# Patient Record
Sex: MALE | Race: WHITE | NOT HISPANIC OR LATINO | URBAN - METROPOLITAN AREA
[De-identification: names, ages, dates, MRNs, and addresses within clinical notes are randomized per-mention and may not be internally consistent; named-entity substitution may affect disease eponyms.]

---

## 2017-01-01 ENCOUNTER — EMERGENCY (EMERGENCY)
Facility: HOSPITAL | Age: 82
LOS: 0 days | Discharge: HOME | End: 2017-04-08
Attending: EMERGENCY MEDICINE
Payer: MEDICARE

## 2017-01-01 ENCOUNTER — INPATIENT (INPATIENT)
Facility: HOSPITAL | Age: 82
LOS: 14 days | Discharge: HOSPICE | End: 2017-06-24
Attending: INTERNAL MEDICINE | Admitting: INTERNAL MEDICINE
Payer: MEDICARE

## 2017-01-01 ENCOUNTER — INPATIENT (INPATIENT)
Facility: HOSPITAL | Age: 82
LOS: 3 days | End: 2017-06-28
Attending: INTERNAL MEDICINE | Admitting: INTERNAL MEDICINE

## 2017-01-01 VITALS
HEART RATE: 135 BPM | TEMPERATURE: 98 F | OXYGEN SATURATION: 96 % | WEIGHT: 125 LBS | SYSTOLIC BLOOD PRESSURE: 118 MMHG | DIASTOLIC BLOOD PRESSURE: 70 MMHG | HEIGHT: 67 IN | RESPIRATION RATE: 16 BRPM

## 2017-01-01 VITALS
HEART RATE: 101 BPM | DIASTOLIC BLOOD PRESSURE: 56 MMHG | WEIGHT: 124.34 LBS | OXYGEN SATURATION: 93 % | SYSTOLIC BLOOD PRESSURE: 119 MMHG | HEIGHT: 68 IN | TEMPERATURE: 99 F | RESPIRATION RATE: 18 BRPM

## 2017-01-01 VITALS
DIASTOLIC BLOOD PRESSURE: 95 MMHG | WEIGHT: 149.91 LBS | OXYGEN SATURATION: 96 % | SYSTOLIC BLOOD PRESSURE: 154 MMHG | HEART RATE: 118 BPM | RESPIRATION RATE: 22 BRPM | HEIGHT: 68 IN

## 2017-01-01 VITALS
TEMPERATURE: 100 F | SYSTOLIC BLOOD PRESSURE: 102 MMHG | RESPIRATION RATE: 20 BRPM | OXYGEN SATURATION: 93 % | HEART RATE: 116 BPM | DIASTOLIC BLOOD PRESSURE: 53 MMHG

## 2017-01-01 VITALS — TEMPERATURE: 101 F

## 2017-01-01 DIAGNOSIS — T83.9XXA UNSPECIFIED COMPLICATION OF GENITOURINARY PROSTHETIC DEVICE, IMPLANT AND GRAFT, INITIAL ENCOUNTER: ICD-10-CM

## 2017-01-01 DIAGNOSIS — E78.5 HYPERLIPIDEMIA, UNSPECIFIED: ICD-10-CM
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
## 2017-01-01 DIAGNOSIS — Z51.5 ENCOUNTER FOR PALLIATIVE CARE: ICD-10-CM

## 2017-01-01 DIAGNOSIS — R09.89 OTHER SPECIFIED SYMPTOMS AND SIGNS INVOLVING THE CIRCULATORY AND RESPIRATORY SYSTEMS: ICD-10-CM

## 2017-01-01 DIAGNOSIS — I50.21 ACUTE SYSTOLIC (CONGESTIVE) HEART FAILURE: ICD-10-CM

## 2017-01-01 DIAGNOSIS — E78.00 PURE HYPERCHOLESTEROLEMIA, UNSPECIFIED: ICD-10-CM
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
## 2017-01-01 DIAGNOSIS — Z79.82 LONG TERM (CURRENT) USE OF ASPIRIN: ICD-10-CM

## 2017-01-01 DIAGNOSIS — N39.0 URINARY TRACT INFECTION, SITE NOT SPECIFIED: ICD-10-CM

## 2017-01-01 DIAGNOSIS — B96.20 UNSPECIFIED ESCHERICHIA COLI [E. COLI] AS THE CAUSE OF DISEASES CLASSIFIED ELSEWHERE: ICD-10-CM

## 2017-01-01 DIAGNOSIS — I10 ESSENTIAL (PRIMARY) HYPERTENSION: ICD-10-CM

## 2017-01-01 DIAGNOSIS — Z66 DO NOT RESUSCITATE: ICD-10-CM

## 2017-01-01 DIAGNOSIS — I27.2 OTHER SECONDARY PULMONARY HYPERTENSION: ICD-10-CM

## 2017-01-01 DIAGNOSIS — I11.0 HYPERTENSIVE HEART DISEASE WITH HEART FAILURE: ICD-10-CM

## 2017-01-01 DIAGNOSIS — A41.51 SEPSIS DUE TO ESCHERICHIA COLI [E. COLI]: ICD-10-CM

## 2017-01-01 DIAGNOSIS — Z88.8 ALLERGY STATUS TO OTHER DRUGS, MEDICAMENTS AND BIOLOGICAL SUBSTANCES STATUS: ICD-10-CM

## 2017-01-01 DIAGNOSIS — N40.0 BENIGN PROSTATIC HYPERPLASIA WITHOUT LOWER URINARY TRACT SYMPTOMS: ICD-10-CM

## 2017-01-01 DIAGNOSIS — J18.9 PNEUMONIA, UNSPECIFIED ORGANISM: ICD-10-CM

## 2017-01-01 DIAGNOSIS — E43 UNSPECIFIED SEVERE PROTEIN-CALORIE MALNUTRITION: ICD-10-CM

## 2017-01-01 DIAGNOSIS — R10.30 LOWER ABDOMINAL PAIN, UNSPECIFIED: ICD-10-CM

## 2017-01-01 DIAGNOSIS — I21.4 NON-ST ELEVATION (NSTEMI) MYOCARDIAL INFARCTION: ICD-10-CM

## 2017-01-01 DIAGNOSIS — A41.9 SEPSIS, UNSPECIFIED ORGANISM: ICD-10-CM

## 2017-01-01 DIAGNOSIS — R13.10 DYSPHAGIA, UNSPECIFIED: ICD-10-CM

## 2017-01-01 DIAGNOSIS — Z86.73 PERSONAL HISTORY OF TRANSIENT ISCHEMIC ATTACK (TIA), AND CEREBRAL INFARCTION WITHOUT RESIDUAL DEFICITS: ICD-10-CM

## 2017-01-01 DIAGNOSIS — F03.90 UNSPECIFIED DEMENTIA, UNSPECIFIED SEVERITY, WITHOUT BEHAVIORAL DISTURBANCE, PSYCHOTIC DISTURBANCE, MOOD DISTURBANCE, AND ANXIETY: ICD-10-CM

## 2017-01-01 DIAGNOSIS — N40.1 BENIGN PROSTATIC HYPERPLASIA WITH LOWER URINARY TRACT SYMPTOMS: ICD-10-CM

## 2017-01-01 DIAGNOSIS — J69.0 PNEUMONITIS DUE TO INHALATION OF FOOD AND VOMIT: ICD-10-CM

## 2017-01-01 LAB
-  AMIKACIN: SIGNIFICANT CHANGE UP
-  AMPICILLIN/SULBACTAM: SIGNIFICANT CHANGE UP
-  AMPICILLIN: SIGNIFICANT CHANGE UP
-  AZTREONAM: SIGNIFICANT CHANGE UP
-  CEFAZOLIN: SIGNIFICANT CHANGE UP
-  CEFEPIME: SIGNIFICANT CHANGE UP
-  CEFOXITIN: SIGNIFICANT CHANGE UP
-  CEFTAZIDIME: SIGNIFICANT CHANGE UP
-  CEFTRIAXONE: SIGNIFICANT CHANGE UP
-  CIPROFLOXACIN: SIGNIFICANT CHANGE UP
-  ERTAPENEM: SIGNIFICANT CHANGE UP
-  GENTAMICIN: SIGNIFICANT CHANGE UP
-  IMIPENEM: SIGNIFICANT CHANGE UP
-  LEVOFLOXACIN: SIGNIFICANT CHANGE UP
-  MEROPENEM: SIGNIFICANT CHANGE UP
-  NITROFURANTOIN: SIGNIFICANT CHANGE UP
-  PIPERACILLIN/TAZOBACTAM: SIGNIFICANT CHANGE UP
-  TOBRAMYCIN: SIGNIFICANT CHANGE UP
-  TRIMETHOPRIM/SULFAMETHOXAZOLE: SIGNIFICANT CHANGE UP
ALBUMIN SERPL ELPH-MCNC: 2.4 G/DL — LOW (ref 3.3–5)
ALBUMIN SERPL ELPH-MCNC: 3.5 G/DL — SIGNIFICANT CHANGE UP (ref 3.3–5)
ALP SERPL-CCNC: 102 U/L — SIGNIFICANT CHANGE UP (ref 40–120)
ALP SERPL-CCNC: 63 U/L — SIGNIFICANT CHANGE UP (ref 40–120)
ALT FLD-CCNC: 136 U/L — HIGH (ref 12–78)
ALT FLD-CCNC: 23 U/L — SIGNIFICANT CHANGE UP (ref 12–78)
ANION GAP SERPL CALC-SCNC: 10 MMOL/L — SIGNIFICANT CHANGE UP (ref 5–17)
ANION GAP SERPL CALC-SCNC: 11 MMOL/L — SIGNIFICANT CHANGE UP (ref 5–17)
ANION GAP SERPL CALC-SCNC: 12 MMOL/L — SIGNIFICANT CHANGE UP (ref 5–17)
ANION GAP SERPL CALC-SCNC: 9 MMOL/L — SIGNIFICANT CHANGE UP (ref 5–17)
ANION GAP SERPL CALC-SCNC: 9 MMOL/L — SIGNIFICANT CHANGE UP (ref 5–17)
APPEARANCE UR: ABNORMAL
APTT BLD: 39 SEC — HIGH (ref 27.5–37.4)
AST SERPL-CCNC: 124 U/L — HIGH (ref 15–37)
AST SERPL-CCNC: 35 U/L — SIGNIFICANT CHANGE UP (ref 15–37)
BACTERIA # UR AUTO: ABNORMAL
BASOPHILS # BLD AUTO: 0 K/UL — SIGNIFICANT CHANGE UP (ref 0–0.2)
BASOPHILS # BLD AUTO: 0.1 K/UL — SIGNIFICANT CHANGE UP (ref 0–0.2)
BASOPHILS NFR BLD AUTO: 0.4 % — SIGNIFICANT CHANGE UP (ref 0–2)
BASOPHILS NFR BLD AUTO: 0.8 % — SIGNIFICANT CHANGE UP (ref 0–2)
BILIRUB SERPL-MCNC: 0.7 MG/DL — SIGNIFICANT CHANGE UP (ref 0.2–1.2)
BILIRUB SERPL-MCNC: 1.6 MG/DL — HIGH (ref 0.2–1.2)
BILIRUB UR-MCNC: NEGATIVE — SIGNIFICANT CHANGE UP
BUN SERPL-MCNC: 22 MG/DL — SIGNIFICANT CHANGE UP (ref 7–23)
BUN SERPL-MCNC: 23 MG/DL — SIGNIFICANT CHANGE UP (ref 7–23)
BUN SERPL-MCNC: 24 MG/DL — HIGH (ref 7–23)
BUN SERPL-MCNC: 26 MG/DL — HIGH (ref 7–23)
BUN SERPL-MCNC: 31 MG/DL — HIGH (ref 7–23)
BUN SERPL-MCNC: 37 MG/DL — HIGH (ref 7–23)
BUN SERPL-MCNC: 61 MG/DL — HIGH (ref 7–23)
BUN SERPL-MCNC: 68 MG/DL — HIGH (ref 7–23)
CALCIUM SERPL-MCNC: 8.2 MG/DL — LOW (ref 8.5–10.1)
CALCIUM SERPL-MCNC: 8.3 MG/DL — LOW (ref 8.5–10.1)
CALCIUM SERPL-MCNC: 8.5 MG/DL — SIGNIFICANT CHANGE UP (ref 8.5–10.1)
CALCIUM SERPL-MCNC: 8.8 MG/DL — SIGNIFICANT CHANGE UP (ref 8.5–10.1)
CALCIUM SERPL-MCNC: 9.1 MG/DL — SIGNIFICANT CHANGE UP (ref 8.5–10.1)
CALCIUM SERPL-MCNC: 9.2 MG/DL — SIGNIFICANT CHANGE UP (ref 8.5–10.1)
CHLORIDE SERPL-SCNC: 105 MMOL/L — SIGNIFICANT CHANGE UP (ref 96–108)
CHLORIDE SERPL-SCNC: 109 MMOL/L — HIGH (ref 96–108)
CHLORIDE SERPL-SCNC: 109 MMOL/L — HIGH (ref 96–108)
CHLORIDE SERPL-SCNC: 110 MMOL/L — HIGH (ref 96–108)
CHLORIDE SERPL-SCNC: 110 MMOL/L — HIGH (ref 96–108)
CHLORIDE SERPL-SCNC: 117 MMOL/L — HIGH (ref 96–108)
CK MB BLD-MCNC: 4.7 % — HIGH (ref 0–3.5)
CK MB CFR SERPL CALC: 3.5 NG/ML — SIGNIFICANT CHANGE UP (ref 0.5–3.6)
CK SERPL-CCNC: 75 U/L — SIGNIFICANT CHANGE UP (ref 26–308)
CO2 SERPL-SCNC: 23 MMOL/L — SIGNIFICANT CHANGE UP (ref 22–31)
CO2 SERPL-SCNC: 23 MMOL/L — SIGNIFICANT CHANGE UP (ref 22–31)
CO2 SERPL-SCNC: 24 MMOL/L — SIGNIFICANT CHANGE UP (ref 22–31)
CO2 SERPL-SCNC: 26 MMOL/L — SIGNIFICANT CHANGE UP (ref 22–31)
CO2 SERPL-SCNC: 26 MMOL/L — SIGNIFICANT CHANGE UP (ref 22–31)
COLOR SPEC: YELLOW — SIGNIFICANT CHANGE UP
COMMENT - URINE: SIGNIFICANT CHANGE UP
CREAT SERPL-MCNC: 0.88 MG/DL — SIGNIFICANT CHANGE UP (ref 0.5–1.3)
CREAT SERPL-MCNC: 0.99 MG/DL — SIGNIFICANT CHANGE UP (ref 0.5–1.3)
CREAT SERPL-MCNC: 1 MG/DL — SIGNIFICANT CHANGE UP (ref 0.5–1.3)
CREAT SERPL-MCNC: 1.03 MG/DL — SIGNIFICANT CHANGE UP (ref 0.5–1.3)
CREAT SERPL-MCNC: 1.2 MG/DL — SIGNIFICANT CHANGE UP (ref 0.5–1.3)
CREAT SERPL-MCNC: 1.28 MG/DL — SIGNIFICANT CHANGE UP (ref 0.5–1.3)
CREAT SERPL-MCNC: 1.35 MG/DL — HIGH (ref 0.5–1.3)
CREAT SERPL-MCNC: 1.44 MG/DL — HIGH (ref 0.5–1.3)
CULTURE RESULTS: SIGNIFICANT CHANGE UP
DIFF PNL FLD: ABNORMAL
EOSINOPHIL # BLD AUTO: 0 K/UL — SIGNIFICANT CHANGE UP (ref 0–0.5)
EOSINOPHIL # BLD AUTO: 0.1 K/UL — SIGNIFICANT CHANGE UP (ref 0–0.5)
EOSINOPHIL NFR BLD AUTO: 0 % — SIGNIFICANT CHANGE UP (ref 0–6)
EOSINOPHIL NFR BLD AUTO: 0.7 % — SIGNIFICANT CHANGE UP (ref 0–6)
EPI CELLS # UR: SIGNIFICANT CHANGE UP
GLUCOSE SERPL-MCNC: 106 MG/DL — HIGH (ref 70–99)
GLUCOSE SERPL-MCNC: 124 MG/DL — HIGH (ref 70–99)
GLUCOSE SERPL-MCNC: 131 MG/DL — HIGH (ref 70–99)
GLUCOSE SERPL-MCNC: 151 MG/DL — HIGH (ref 70–99)
GLUCOSE SERPL-MCNC: 154 MG/DL — HIGH (ref 70–99)
GLUCOSE SERPL-MCNC: 164 MG/DL — HIGH (ref 70–99)
GLUCOSE SERPL-MCNC: 174 MG/DL — HIGH (ref 70–99)
GLUCOSE SERPL-MCNC: 83 MG/DL — SIGNIFICANT CHANGE UP (ref 70–99)
GLUCOSE UR QL: NEGATIVE MG/DL — SIGNIFICANT CHANGE UP
GRAN CASTS # UR COMP ASSIST: ABNORMAL /LPF
HCT VFR BLD CALC: 31.6 % — LOW (ref 39–50)
HCT VFR BLD CALC: 33.2 % — LOW (ref 39–50)
HCT VFR BLD CALC: 33.3 % — LOW (ref 39–50)
HCT VFR BLD CALC: 33.6 % — LOW (ref 39–50)
HCT VFR BLD CALC: 33.8 % — LOW (ref 39–50)
HCT VFR BLD CALC: 34.8 % — LOW (ref 39–50)
HCT VFR BLD CALC: 35.7 % — LOW (ref 39–50)
HCT VFR BLD CALC: 37.5 % — LOW (ref 39–50)
HGB BLD-MCNC: 10.9 G/DL — LOW (ref 13–17)
HGB BLD-MCNC: 11.2 G/DL — LOW (ref 13–17)
HGB BLD-MCNC: 11.3 G/DL — LOW (ref 13–17)
HGB BLD-MCNC: 11.4 G/DL — LOW (ref 13–17)
HGB BLD-MCNC: 11.6 G/DL — LOW (ref 13–17)
HGB BLD-MCNC: 11.7 G/DL — LOW (ref 13–17)
HGB BLD-MCNC: 11.7 G/DL — LOW (ref 13–17)
HGB BLD-MCNC: 12.6 G/DL — LOW (ref 13–17)
INR BLD: 1.39 RATIO — HIGH (ref 0.88–1.16)
KETONES UR-MCNC: ABNORMAL
LACTATE SERPL-SCNC: 2.6 MMOL/L — HIGH (ref 0.7–2)
LEGIONELLA AG UR QL: NEGATIVE — SIGNIFICANT CHANGE UP
LEUKOCYTE ESTERASE UR-ACNC: ABNORMAL
LIDOCAIN IGE QN: 52 U/L — LOW (ref 73–393)
LYMPHOCYTES # BLD AUTO: 0.7 K/UL — LOW (ref 1–3.3)
LYMPHOCYTES # BLD AUTO: 1.1 K/UL — SIGNIFICANT CHANGE UP (ref 1–3.3)
LYMPHOCYTES # BLD AUTO: 12.8 % — LOW (ref 13–44)
LYMPHOCYTES # BLD AUTO: 5.7 % — LOW (ref 13–44)
MAGNESIUM SERPL-MCNC: 2.1 MG/DL — SIGNIFICANT CHANGE UP (ref 1.6–2.6)
MAGNESIUM SERPL-MCNC: 2.2 MG/DL — SIGNIFICANT CHANGE UP (ref 1.6–2.6)
MAGNESIUM SERPL-MCNC: 2.3 MG/DL — SIGNIFICANT CHANGE UP (ref 1.6–2.6)
MCHC RBC-ENTMCNC: 25.5 PG — LOW (ref 27–34)
MCHC RBC-ENTMCNC: 26.1 PG — LOW (ref 27–34)
MCHC RBC-ENTMCNC: 26.3 PG — LOW (ref 27–34)
MCHC RBC-ENTMCNC: 26.4 PG — LOW (ref 27–34)
MCHC RBC-ENTMCNC: 26.6 PG — LOW (ref 27–34)
MCHC RBC-ENTMCNC: 26.7 PG — LOW (ref 27–34)
MCHC RBC-ENTMCNC: 26.8 PG — LOW (ref 27–34)
MCHC RBC-ENTMCNC: 27.1 PG — SIGNIFICANT CHANGE UP (ref 27–34)
MCHC RBC-ENTMCNC: 32.8 GM/DL — SIGNIFICANT CHANGE UP (ref 32–36)
MCHC RBC-ENTMCNC: 33.5 GM/DL — SIGNIFICANT CHANGE UP (ref 32–36)
MCHC RBC-ENTMCNC: 33.7 GM/DL — SIGNIFICANT CHANGE UP (ref 32–36)
MCHC RBC-ENTMCNC: 33.7 GM/DL — SIGNIFICANT CHANGE UP (ref 32–36)
MCHC RBC-ENTMCNC: 33.9 GM/DL — SIGNIFICANT CHANGE UP (ref 32–36)
MCHC RBC-ENTMCNC: 34 GM/DL — SIGNIFICANT CHANGE UP (ref 32–36)
MCHC RBC-ENTMCNC: 34.3 GM/DL — SIGNIFICANT CHANGE UP (ref 32–36)
MCHC RBC-ENTMCNC: 34.4 GM/DL — SIGNIFICANT CHANGE UP (ref 32–36)
MCV RBC AUTO: 77.3 FL — LOW (ref 80–100)
MCV RBC AUTO: 77.7 FL — LOW (ref 80–100)
MCV RBC AUTO: 77.8 FL — LOW (ref 80–100)
MCV RBC AUTO: 77.9 FL — LOW (ref 80–100)
MCV RBC AUTO: 78.5 FL — LOW (ref 80–100)
MCV RBC AUTO: 78.9 FL — LOW (ref 80–100)
METHOD TYPE: SIGNIFICANT CHANGE UP
MONOCYTES # BLD AUTO: 0.7 K/UL — SIGNIFICANT CHANGE UP (ref 0–0.9)
MONOCYTES # BLD AUTO: 0.7 K/UL — SIGNIFICANT CHANGE UP (ref 0–0.9)
MONOCYTES NFR BLD AUTO: 5.5 % — SIGNIFICANT CHANGE UP (ref 2–14)
MONOCYTES NFR BLD AUTO: 8.4 % — SIGNIFICANT CHANGE UP (ref 2–14)
NEUTROPHILS # BLD AUTO: 11.4 K/UL — HIGH (ref 1.8–7.4)
NEUTROPHILS # BLD AUTO: 6.8 K/UL — SIGNIFICANT CHANGE UP (ref 1.8–7.4)
NEUTROPHILS NFR BLD AUTO: 77.7 % — HIGH (ref 43–77)
NEUTROPHILS NFR BLD AUTO: 88 % — HIGH (ref 43–77)
NITRITE UR-MCNC: POSITIVE
NT-PROBNP SERPL-SCNC: HIGH PG/ML (ref 0–450)
NT-PROBNP SERPL-SCNC: HIGH PG/ML (ref 0–450)
ORGANISM # SPEC MICROSCOPIC CNT: SIGNIFICANT CHANGE UP
ORGANISM # SPEC MICROSCOPIC CNT: SIGNIFICANT CHANGE UP
PH UR: 6 — SIGNIFICANT CHANGE UP (ref 5–8)
PHOSPHATE SERPL-MCNC: 3 MG/DL — SIGNIFICANT CHANGE UP (ref 2.5–4.5)
PHOSPHATE SERPL-MCNC: 3 MG/DL — SIGNIFICANT CHANGE UP (ref 2.5–4.5)
PHOSPHATE SERPL-MCNC: 3.5 MG/DL — SIGNIFICANT CHANGE UP (ref 2.5–4.5)
PLATELET # BLD AUTO: 180 K/UL — SIGNIFICANT CHANGE UP (ref 150–400)
PLATELET # BLD AUTO: 213 K/UL — SIGNIFICANT CHANGE UP (ref 150–400)
PLATELET # BLD AUTO: 214 K/UL — SIGNIFICANT CHANGE UP (ref 150–400)
PLATELET # BLD AUTO: 219 K/UL — SIGNIFICANT CHANGE UP (ref 150–400)
PLATELET # BLD AUTO: 236 K/UL — SIGNIFICANT CHANGE UP (ref 150–400)
PLATELET # BLD AUTO: 253 K/UL — SIGNIFICANT CHANGE UP (ref 150–400)
PLATELET # BLD AUTO: 258 K/UL — SIGNIFICANT CHANGE UP (ref 150–400)
PLATELET # BLD AUTO: 283 K/UL — SIGNIFICANT CHANGE UP (ref 150–400)
POTASSIUM SERPL-MCNC: 3.5 MMOL/L — SIGNIFICANT CHANGE UP (ref 3.5–5.3)
POTASSIUM SERPL-MCNC: 3.5 MMOL/L — SIGNIFICANT CHANGE UP (ref 3.5–5.3)
POTASSIUM SERPL-MCNC: 3.7 MMOL/L — SIGNIFICANT CHANGE UP (ref 3.5–5.3)
POTASSIUM SERPL-MCNC: 3.8 MMOL/L — SIGNIFICANT CHANGE UP (ref 3.5–5.3)
POTASSIUM SERPL-MCNC: 4.3 MMOL/L — SIGNIFICANT CHANGE UP (ref 3.5–5.3)
POTASSIUM SERPL-MCNC: 4.4 MMOL/L — SIGNIFICANT CHANGE UP (ref 3.5–5.3)
POTASSIUM SERPL-SCNC: 3.5 MMOL/L — SIGNIFICANT CHANGE UP (ref 3.5–5.3)
POTASSIUM SERPL-SCNC: 3.5 MMOL/L — SIGNIFICANT CHANGE UP (ref 3.5–5.3)
POTASSIUM SERPL-SCNC: 3.7 MMOL/L — SIGNIFICANT CHANGE UP (ref 3.5–5.3)
POTASSIUM SERPL-SCNC: 3.8 MMOL/L — SIGNIFICANT CHANGE UP (ref 3.5–5.3)
POTASSIUM SERPL-SCNC: 4.3 MMOL/L — SIGNIFICANT CHANGE UP (ref 3.5–5.3)
POTASSIUM SERPL-SCNC: 4.4 MMOL/L — SIGNIFICANT CHANGE UP (ref 3.5–5.3)
PROCALCITONIN SERPL-MCNC: 0.18 NG/ML — HIGH (ref 0–0.04)
PROT SERPL-MCNC: 6.4 GM/DL — SIGNIFICANT CHANGE UP (ref 6–8.3)
PROT SERPL-MCNC: 7.2 GM/DL — SIGNIFICANT CHANGE UP (ref 6–8.3)
PROT UR-MCNC: 500 MG/DL
PROTHROM AB SERPL-ACNC: 15.3 SEC — HIGH (ref 9.8–12.7)
RBC # BLD: 4.01 M/UL — LOW (ref 4.2–5.8)
RBC # BLD: 4.21 M/UL — SIGNIFICANT CHANGE UP (ref 4.2–5.8)
RBC # BLD: 4.24 M/UL — SIGNIFICANT CHANGE UP (ref 4.2–5.8)
RBC # BLD: 4.32 M/UL — SIGNIFICANT CHANGE UP (ref 4.2–5.8)
RBC # BLD: 4.35 M/UL — SIGNIFICANT CHANGE UP (ref 4.2–5.8)
RBC # BLD: 4.5 M/UL — SIGNIFICANT CHANGE UP (ref 4.2–5.8)
RBC # BLD: 4.59 M/UL — SIGNIFICANT CHANGE UP (ref 4.2–5.8)
RBC # BLD: 4.76 M/UL — SIGNIFICANT CHANGE UP (ref 4.2–5.8)
RBC # FLD: 13.3 % — SIGNIFICANT CHANGE UP (ref 11–15)
RBC # FLD: 13.4 % — SIGNIFICANT CHANGE UP (ref 11–15)
RBC # FLD: 13.4 % — SIGNIFICANT CHANGE UP (ref 11–15)
RBC # FLD: 13.5 % — SIGNIFICANT CHANGE UP (ref 11–15)
RBC # FLD: 13.6 % — SIGNIFICANT CHANGE UP (ref 11–15)
RBC # FLD: 13.7 % — SIGNIFICANT CHANGE UP (ref 11–15)
RBC # FLD: 14.3 % — SIGNIFICANT CHANGE UP (ref 11–15)
RBC # FLD: 14.7 % — SIGNIFICANT CHANGE UP (ref 11–15)
RBC CASTS # UR COMP ASSIST: ABNORMAL /HPF (ref 0–4)
SODIUM SERPL-SCNC: 138 MMOL/L — SIGNIFICANT CHANGE UP (ref 135–145)
SODIUM SERPL-SCNC: 139 MMOL/L — SIGNIFICANT CHANGE UP (ref 135–145)
SODIUM SERPL-SCNC: 140 MMOL/L — SIGNIFICANT CHANGE UP (ref 135–145)
SODIUM SERPL-SCNC: 143 MMOL/L — SIGNIFICANT CHANGE UP (ref 135–145)
SODIUM SERPL-SCNC: 146 MMOL/L — HIGH (ref 135–145)
SODIUM SERPL-SCNC: 153 MMOL/L — HIGH (ref 135–145)
SP GR SPEC: 1.02 — SIGNIFICANT CHANGE UP (ref 1.01–1.02)
SPECIMEN SOURCE: SIGNIFICANT CHANGE UP
TROPONIN I SERPL-MCNC: 0.35 NG/ML — HIGH (ref 0.01–0.04)
TROPONIN I SERPL-MCNC: 0.4 NG/ML — HIGH (ref 0.01–0.04)
TROPONIN I SERPL-MCNC: 0.5 NG/ML — HIGH (ref 0.01–0.04)
TROPONIN I SERPL-MCNC: 0.53 NG/ML — HIGH (ref 0.01–0.04)
TROPONIN I SERPL-MCNC: 0.57 NG/ML — HIGH (ref 0.01–0.04)
UROBILINOGEN FLD QL: NEGATIVE MG/DL — SIGNIFICANT CHANGE UP
VANCOMYCIN TROUGH SERPL-MCNC: 14.9 UG/ML — SIGNIFICANT CHANGE UP (ref 10–20)
WBC # BLD: 10.8 K/UL — HIGH (ref 3.8–10.5)
WBC # BLD: 12.9 K/UL — HIGH (ref 3.8–10.5)
WBC # BLD: 14.7 K/UL — HIGH (ref 3.8–10.5)
WBC # BLD: 16 K/UL — HIGH (ref 3.8–10.5)
WBC # BLD: 17.1 K/UL — HIGH (ref 3.8–10.5)
WBC # BLD: 8.7 K/UL — SIGNIFICANT CHANGE UP (ref 3.8–10.5)
WBC # BLD: 9.1 K/UL — SIGNIFICANT CHANGE UP (ref 3.8–10.5)
WBC # BLD: 9.4 K/UL — SIGNIFICANT CHANGE UP (ref 3.8–10.5)
WBC # FLD AUTO: 10.8 K/UL — HIGH (ref 3.8–10.5)
WBC # FLD AUTO: 12.9 K/UL — HIGH (ref 3.8–10.5)
WBC # FLD AUTO: 14.7 K/UL — HIGH (ref 3.8–10.5)
WBC # FLD AUTO: 16 K/UL — HIGH (ref 3.8–10.5)
WBC # FLD AUTO: 17.1 K/UL — HIGH (ref 3.8–10.5)
WBC # FLD AUTO: 8.7 K/UL — SIGNIFICANT CHANGE UP (ref 3.8–10.5)
WBC # FLD AUTO: 9.1 K/UL — SIGNIFICANT CHANGE UP (ref 3.8–10.5)
WBC # FLD AUTO: 9.4 K/UL — SIGNIFICANT CHANGE UP (ref 3.8–10.5)
WBC UR QL: SIGNIFICANT CHANGE UP /HPF (ref 0–5)

## 2017-01-01 PROCEDURE — 71010: CPT | Mod: 26

## 2017-01-01 PROCEDURE — 99291 CRITICAL CARE FIRST HOUR: CPT

## 2017-01-01 PROCEDURE — 99283 EMERGENCY DEPT VISIT LOW MDM: CPT | Mod: 25

## 2017-01-01 PROCEDURE — 78452 HT MUSCLE IMAGE SPECT MULT: CPT | Mod: 26

## 2017-01-01 PROCEDURE — 93306 TTE W/DOPPLER COMPLETE: CPT | Mod: 26

## 2017-01-01 PROCEDURE — 74176 CT ABD & PELVIS W/O CONTRAST: CPT | Mod: 26

## 2017-01-01 RX ORDER — ROBINUL 0.2 MG/ML
0.2 INJECTION INTRAMUSCULAR; INTRAVENOUS EVERY 8 HOURS
Qty: 0 | Refills: 0 | Status: DISCONTINUED | OUTPATIENT
Start: 2017-01-01 | End: 2017-01-01

## 2017-01-01 RX ORDER — PIPERACILLIN AND TAZOBACTAM 4; .5 G/20ML; G/20ML
3.38 INJECTION, POWDER, LYOPHILIZED, FOR SOLUTION INTRAVENOUS EVERY 8 HOURS
Qty: 0 | Refills: 0 | Status: DISCONTINUED | OUTPATIENT
Start: 2017-01-01 | End: 2017-01-01

## 2017-01-01 RX ORDER — VANCOMYCIN HCL 1 G
1000 VIAL (EA) INTRAVENOUS EVERY 12 HOURS
Qty: 0 | Refills: 0 | Status: DISCONTINUED | OUTPATIENT
Start: 2017-01-01 | End: 2017-01-01

## 2017-01-01 RX ORDER — HEPARIN SODIUM 5000 [USP'U]/ML
5000 INJECTION INTRAVENOUS; SUBCUTANEOUS EVERY 12 HOURS
Qty: 0 | Refills: 0 | Status: DISCONTINUED | OUTPATIENT
Start: 2017-01-01 | End: 2017-01-01

## 2017-01-01 RX ORDER — ALPRAZOLAM 0.25 MG
0.25 TABLET ORAL ONCE
Qty: 0 | Refills: 0 | Status: DISCONTINUED | OUTPATIENT
Start: 2017-01-01 | End: 2017-01-01

## 2017-01-01 RX ORDER — ALPRAZOLAM 0.25 MG
0.25 TABLET ORAL
Qty: 0 | Refills: 0 | COMMUNITY

## 2017-01-01 RX ORDER — AMIODARONE HYDROCHLORIDE 400 MG/1
200 TABLET ORAL DAILY
Qty: 0 | Refills: 0 | Status: DISCONTINUED | OUTPATIENT
Start: 2017-01-01 | End: 2017-01-01

## 2017-01-01 RX ORDER — ASPIRIN/CALCIUM CARB/MAGNESIUM 324 MG
325 TABLET ORAL ONCE
Qty: 0 | Refills: 0 | Status: COMPLETED | OUTPATIENT
Start: 2017-01-01 | End: 2017-01-01

## 2017-01-01 RX ORDER — ACETAMINOPHEN 500 MG
325 TABLET ORAL ONCE
Qty: 0 | Refills: 0 | Status: COMPLETED | OUTPATIENT
Start: 2017-01-01 | End: 2017-01-01

## 2017-01-01 RX ORDER — ASPIRIN/CALCIUM CARB/MAGNESIUM 324 MG
81 TABLET ORAL DAILY
Qty: 0 | Refills: 0 | Status: DISCONTINUED | OUTPATIENT
Start: 2017-01-01 | End: 2017-01-01

## 2017-01-01 RX ORDER — MORPHINE SULFATE 50 MG/1
2 CAPSULE, EXTENDED RELEASE ORAL ONCE
Qty: 0 | Refills: 0 | Status: DISCONTINUED | OUTPATIENT
Start: 2017-01-01 | End: 2017-01-01

## 2017-01-01 RX ORDER — IBUPROFEN 200 MG
400 TABLET ORAL THREE TIMES A DAY
Qty: 0 | Refills: 0 | Status: DISCONTINUED | OUTPATIENT
Start: 2017-01-01 | End: 2017-01-01

## 2017-01-01 RX ORDER — SODIUM CHLORIDE 9 MG/ML
1000 INJECTION, SOLUTION INTRAVENOUS
Qty: 0 | Refills: 0 | Status: DISCONTINUED | OUTPATIENT
Start: 2017-01-01 | End: 2017-01-01

## 2017-01-01 RX ORDER — DIGOXIN 250 MCG
0.25 TABLET ORAL ONCE
Qty: 0 | Refills: 0 | Status: COMPLETED | OUTPATIENT
Start: 2017-01-01 | End: 2017-01-01

## 2017-01-01 RX ORDER — SERTRALINE 25 MG/1
100 TABLET, FILM COATED ORAL DAILY
Qty: 0 | Refills: 0 | Status: DISCONTINUED | OUTPATIENT
Start: 2017-01-01 | End: 2017-01-01

## 2017-01-01 RX ORDER — SODIUM CHLORIDE 9 MG/ML
500 INJECTION INTRAMUSCULAR; INTRAVENOUS; SUBCUTANEOUS ONCE
Qty: 0 | Refills: 0 | Status: COMPLETED | OUTPATIENT
Start: 2017-01-01 | End: 2017-01-01

## 2017-01-01 RX ORDER — ALPRAZOLAM 0.25 MG
0.25 TABLET ORAL
Qty: 0 | Refills: 0 | Status: DISCONTINUED | OUTPATIENT
Start: 2017-01-01 | End: 2017-01-01

## 2017-01-01 RX ORDER — SCOPALAMINE 1 MG/3D
1.5 PATCH, EXTENDED RELEASE TRANSDERMAL
Qty: 0 | Refills: 0 | Status: DISCONTINUED | OUTPATIENT
Start: 2017-01-01 | End: 2017-01-01

## 2017-01-01 RX ORDER — ACETAMINOPHEN 500 MG
650 TABLET ORAL EVERY 6 HOURS
Qty: 0 | Refills: 0 | Status: DISCONTINUED | OUTPATIENT
Start: 2017-01-01 | End: 2017-01-01

## 2017-01-01 RX ORDER — MORPHINE SULFATE 50 MG/1
2 CAPSULE, EXTENDED RELEASE ORAL
Qty: 0 | Refills: 0 | Status: DISCONTINUED | OUTPATIENT
Start: 2017-01-01 | End: 2017-01-01

## 2017-01-01 RX ORDER — AMIODARONE HYDROCHLORIDE 400 MG/1
1 TABLET ORAL
Qty: 900 | Refills: 0 | Status: DISCONTINUED | OUTPATIENT
Start: 2017-01-01 | End: 2017-01-01

## 2017-01-01 RX ORDER — PIPERACILLIN AND TAZOBACTAM 4; .5 G/20ML; G/20ML
3.38 INJECTION, POWDER, LYOPHILIZED, FOR SOLUTION INTRAVENOUS ONCE
Qty: 0 | Refills: 0 | Status: COMPLETED | OUTPATIENT
Start: 2017-01-01 | End: 2017-01-01

## 2017-01-01 RX ORDER — FINASTERIDE 5 MG/1
5 TABLET, FILM COATED ORAL DAILY
Qty: 0 | Refills: 0 | Status: DISCONTINUED | OUTPATIENT
Start: 2017-01-01 | End: 2017-01-01

## 2017-01-01 RX ORDER — ENOXAPARIN SODIUM 100 MG/ML
60 INJECTION SUBCUTANEOUS EVERY 12 HOURS
Qty: 0 | Refills: 0 | Status: DISCONTINUED | OUTPATIENT
Start: 2017-01-01 | End: 2017-01-01

## 2017-01-01 RX ORDER — VANCOMYCIN HCL 1 G
750 VIAL (EA) INTRAVENOUS EVERY 12 HOURS
Qty: 0 | Refills: 0 | Status: DISCONTINUED | OUTPATIENT
Start: 2017-01-01 | End: 2017-01-01

## 2017-01-01 RX ORDER — MORPHINE SULFATE 50 MG/1
2 CAPSULE, EXTENDED RELEASE ORAL EVERY 6 HOURS
Qty: 0 | Refills: 0 | Status: DISCONTINUED | OUTPATIENT
Start: 2017-01-01 | End: 2017-01-01

## 2017-01-01 RX ORDER — MORPHINE SULFATE 50 MG/1
2 CAPSULE, EXTENDED RELEASE ORAL THREE TIMES A DAY
Qty: 0 | Refills: 0 | Status: DISCONTINUED | OUTPATIENT
Start: 2017-01-01 | End: 2017-01-01

## 2017-01-01 RX ORDER — TAMSULOSIN HYDROCHLORIDE 0.4 MG/1
0.4 CAPSULE ORAL AT BEDTIME
Qty: 0 | Refills: 0 | Status: DISCONTINUED | OUTPATIENT
Start: 2017-01-01 | End: 2017-01-01

## 2017-01-01 RX ORDER — FUROSEMIDE 40 MG
20 TABLET ORAL DAILY
Qty: 0 | Refills: 0 | Status: DISCONTINUED | OUTPATIENT
Start: 2017-01-01 | End: 2017-01-01

## 2017-01-01 RX ORDER — ACETAMINOPHEN 500 MG
650 TABLET ORAL ONCE
Qty: 0 | Refills: 0 | Status: COMPLETED | OUTPATIENT
Start: 2017-01-01 | End: 2017-01-01

## 2017-01-01 RX ORDER — FUROSEMIDE 40 MG
20 TABLET ORAL ONCE
Qty: 0 | Refills: 0 | Status: COMPLETED | OUTPATIENT
Start: 2017-01-01 | End: 2017-01-01

## 2017-01-01 RX ORDER — CARVEDILOL PHOSPHATE 80 MG/1
3.12 CAPSULE, EXTENDED RELEASE ORAL EVERY 12 HOURS
Qty: 0 | Refills: 0 | Status: DISCONTINUED | OUTPATIENT
Start: 2017-01-01 | End: 2017-01-01

## 2017-01-01 RX ORDER — CLOPIDOGREL BISULFATE 75 MG/1
75 TABLET, FILM COATED ORAL DAILY
Qty: 0 | Refills: 0 | Status: DISCONTINUED | OUTPATIENT
Start: 2017-01-01 | End: 2017-01-01

## 2017-01-01 RX ORDER — MORPHINE SULFATE 50 MG/1
1 CAPSULE, EXTENDED RELEASE ORAL
Qty: 100 | Refills: 0 | Status: DISCONTINUED | OUTPATIENT
Start: 2017-01-01 | End: 2017-01-01

## 2017-01-01 RX ORDER — ROBINUL 0.2 MG/ML
0.2 INJECTION INTRAMUSCULAR; INTRAVENOUS EVERY 6 HOURS
Qty: 0 | Refills: 0 | Status: DISCONTINUED | OUTPATIENT
Start: 2017-01-01 | End: 2017-01-01

## 2017-01-01 RX ORDER — ACETAMINOPHEN 500 MG
325 TABLET ORAL
Qty: 0 | Refills: 0 | Status: DISCONTINUED | OUTPATIENT
Start: 2017-01-01 | End: 2017-01-01

## 2017-01-01 RX ORDER — SIMVASTATIN 20 MG/1
20 TABLET, FILM COATED ORAL AT BEDTIME
Qty: 0 | Refills: 0 | Status: DISCONTINUED | OUTPATIENT
Start: 2017-01-01 | End: 2017-01-01

## 2017-01-01 RX ORDER — PANTOPRAZOLE SODIUM 20 MG/1
40 TABLET, DELAYED RELEASE ORAL DAILY
Qty: 0 | Refills: 0 | Status: DISCONTINUED | OUTPATIENT
Start: 2017-01-01 | End: 2017-01-01

## 2017-01-01 RX ORDER — SODIUM CHLORIDE 9 MG/ML
1000 INJECTION INTRAMUSCULAR; INTRAVENOUS; SUBCUTANEOUS ONCE
Qty: 0 | Refills: 0 | Status: COMPLETED | OUTPATIENT
Start: 2017-01-01 | End: 2017-01-01

## 2017-01-01 RX ORDER — LISINOPRIL 2.5 MG/1
10 TABLET ORAL DAILY
Qty: 0 | Refills: 0 | Status: DISCONTINUED | OUTPATIENT
Start: 2017-01-01 | End: 2017-01-01

## 2017-01-01 RX ORDER — VANCOMYCIN HCL 1 G
1000 VIAL (EA) INTRAVENOUS ONCE
Qty: 0 | Refills: 0 | Status: COMPLETED | OUTPATIENT
Start: 2017-01-01 | End: 2017-01-01

## 2017-01-01 RX ORDER — CARVEDILOL PHOSPHATE 80 MG/1
12.5 CAPSULE, EXTENDED RELEASE ORAL EVERY 12 HOURS
Qty: 0 | Refills: 0 | Status: DISCONTINUED | OUTPATIENT
Start: 2017-01-01 | End: 2017-01-01

## 2017-01-01 RX ORDER — PANTOPRAZOLE SODIUM 20 MG/1
40 TABLET, DELAYED RELEASE ORAL
Qty: 0 | Refills: 0 | Status: DISCONTINUED | OUTPATIENT
Start: 2017-01-01 | End: 2017-01-01

## 2017-01-01 RX ORDER — AMIODARONE HYDROCHLORIDE 400 MG/1
0.5 TABLET ORAL
Qty: 900 | Refills: 0 | Status: DISCONTINUED | OUTPATIENT
Start: 2017-01-01 | End: 2017-01-01

## 2017-01-01 RX ORDER — DIGOXIN 250 MCG
0.25 TABLET ORAL EVERY 6 HOURS
Qty: 0 | Refills: 0 | Status: COMPLETED | OUTPATIENT
Start: 2017-01-01 | End: 2017-01-01

## 2017-01-01 RX ORDER — CALCIUM CARBONATE 500(1250)
0 TABLET ORAL
Qty: 0 | Refills: 0 | COMMUNITY

## 2017-01-01 RX ORDER — LACTULOSE 10 G/15ML
10 SOLUTION ORAL DAILY
Qty: 0 | Refills: 0 | Status: DISCONTINUED | OUTPATIENT
Start: 2017-01-01 | End: 2017-01-01

## 2017-01-01 RX ORDER — ENOXAPARIN SODIUM 100 MG/ML
40 INJECTION SUBCUTANEOUS DAILY
Qty: 0 | Refills: 0 | Status: DISCONTINUED | OUTPATIENT
Start: 2017-01-01 | End: 2017-01-01

## 2017-01-01 RX ORDER — CARVEDILOL PHOSPHATE 80 MG/1
6.25 CAPSULE, EXTENDED RELEASE ORAL EVERY 12 HOURS
Qty: 0 | Refills: 0 | Status: DISCONTINUED | OUTPATIENT
Start: 2017-01-01 | End: 2017-01-01

## 2017-01-01 RX ORDER — MEROPENEM 1 G/30ML
500 INJECTION INTRAVENOUS EVERY 8 HOURS
Qty: 0 | Refills: 0 | Status: DISCONTINUED | OUTPATIENT
Start: 2017-01-01 | End: 2017-01-01

## 2017-01-01 RX ORDER — RISPERIDONE 4 MG/1
1 TABLET ORAL
Qty: 0 | Refills: 0 | COMMUNITY

## 2017-01-01 RX ORDER — ALBUTEROL 90 UG/1
2.5 AEROSOL, METERED ORAL EVERY 6 HOURS
Qty: 0 | Refills: 0 | Status: DISCONTINUED | OUTPATIENT
Start: 2017-01-01 | End: 2017-01-01

## 2017-01-01 RX ORDER — ACETAMINOPHEN 500 MG
650 TABLET ORAL THREE TIMES A DAY
Qty: 0 | Refills: 0 | Status: DISCONTINUED | OUTPATIENT
Start: 2017-01-01 | End: 2017-01-01

## 2017-01-01 RX ORDER — RISPERIDONE 4 MG/1
1 TABLET ORAL
Qty: 0 | Refills: 0 | Status: DISCONTINUED | OUTPATIENT
Start: 2017-01-01 | End: 2017-01-01

## 2017-01-01 RX ORDER — FUROSEMIDE 40 MG
40 TABLET ORAL DAILY
Qty: 0 | Refills: 0 | Status: DISCONTINUED | OUTPATIENT
Start: 2017-01-01 | End: 2017-01-01

## 2017-01-01 RX ORDER — METOPROLOL TARTRATE 50 MG
12.5 TABLET ORAL
Qty: 0 | Refills: 0 | Status: DISCONTINUED | OUTPATIENT
Start: 2017-01-01 | End: 2017-01-01

## 2017-01-01 RX ADMIN — MORPHINE SULFATE 2 MILLIGRAM(S): 50 CAPSULE, EXTENDED RELEASE ORAL at 00:46

## 2017-01-01 RX ADMIN — SIMVASTATIN 20 MILLIGRAM(S): 20 TABLET, FILM COATED ORAL at 22:28

## 2017-01-01 RX ADMIN — MORPHINE SULFATE 2 MILLIGRAM(S): 50 CAPSULE, EXTENDED RELEASE ORAL at 12:30

## 2017-01-01 RX ADMIN — MEROPENEM 200 MILLIGRAM(S): 1 INJECTION INTRAVENOUS at 22:28

## 2017-01-01 RX ADMIN — RISPERIDONE 1 MILLIGRAM(S): 4 TABLET ORAL at 17:25

## 2017-01-01 RX ADMIN — AMIODARONE HYDROCHLORIDE 200 MILLIGRAM(S): 400 TABLET ORAL at 06:16

## 2017-01-01 RX ADMIN — SIMVASTATIN 20 MILLIGRAM(S): 20 TABLET, FILM COATED ORAL at 21:37

## 2017-01-01 RX ADMIN — Medication 650 MILLIGRAM(S): at 08:37

## 2017-01-01 RX ADMIN — ENOXAPARIN SODIUM 40 MILLIGRAM(S): 100 INJECTION SUBCUTANEOUS at 11:06

## 2017-01-01 RX ADMIN — Medication 650 MILLIGRAM(S): at 18:00

## 2017-01-01 RX ADMIN — MORPHINE SULFATE 2 MILLIGRAM(S): 50 CAPSULE, EXTENDED RELEASE ORAL at 17:20

## 2017-01-01 RX ADMIN — Medication 0.25 MILLIGRAM(S): at 17:21

## 2017-01-01 RX ADMIN — PANTOPRAZOLE SODIUM 40 MILLIGRAM(S): 20 TABLET, DELAYED RELEASE ORAL at 12:38

## 2017-01-01 RX ADMIN — MEROPENEM 200 MILLIGRAM(S): 1 INJECTION INTRAVENOUS at 14:07

## 2017-01-01 RX ADMIN — ENOXAPARIN SODIUM 40 MILLIGRAM(S): 100 INJECTION SUBCUTANEOUS at 11:18

## 2017-01-01 RX ADMIN — MORPHINE SULFATE 2 MILLIGRAM(S): 50 CAPSULE, EXTENDED RELEASE ORAL at 14:15

## 2017-01-01 RX ADMIN — Medication 0.25 MILLIGRAM(S): at 05:54

## 2017-01-01 RX ADMIN — Medication 400 MILLIGRAM(S): at 22:45

## 2017-01-01 RX ADMIN — TAMSULOSIN HYDROCHLORIDE 0.4 MILLIGRAM(S): 0.4 CAPSULE ORAL at 21:48

## 2017-01-01 RX ADMIN — Medication 20 MILLIGRAM(S): at 18:19

## 2017-01-01 RX ADMIN — PIPERACILLIN AND TAZOBACTAM 25 GRAM(S): 4; .5 INJECTION, POWDER, LYOPHILIZED, FOR SOLUTION INTRAVENOUS at 21:48

## 2017-01-01 RX ADMIN — MORPHINE SULFATE 2 MILLIGRAM(S): 50 CAPSULE, EXTENDED RELEASE ORAL at 11:13

## 2017-01-01 RX ADMIN — MORPHINE SULFATE 2 MILLIGRAM(S): 50 CAPSULE, EXTENDED RELEASE ORAL at 05:56

## 2017-01-01 RX ADMIN — Medication 650 MILLIGRAM(S): at 14:00

## 2017-01-01 RX ADMIN — ROBINUL 0.2 MILLIGRAM(S): 0.2 INJECTION INTRAMUSCULAR; INTRAVENOUS at 05:12

## 2017-01-01 RX ADMIN — Medication 0.5 MILLIGRAM(S): at 11:19

## 2017-01-01 RX ADMIN — Medication 650 MILLIGRAM(S): at 15:47

## 2017-01-01 RX ADMIN — SIMVASTATIN 20 MILLIGRAM(S): 20 TABLET, FILM COATED ORAL at 22:08

## 2017-01-01 RX ADMIN — Medication 1 TABLET(S): at 13:06

## 2017-01-01 RX ADMIN — Medication 400 MILLIGRAM(S): at 22:28

## 2017-01-01 RX ADMIN — SERTRALINE 100 MILLIGRAM(S): 25 TABLET, FILM COATED ORAL at 17:12

## 2017-01-01 RX ADMIN — LACTULOSE 10 GRAM(S): 10 SOLUTION ORAL at 11:41

## 2017-01-01 RX ADMIN — SODIUM CHLORIDE 500 MILLILITER(S): 9 INJECTION INTRAMUSCULAR; INTRAVENOUS; SUBCUTANEOUS at 16:46

## 2017-01-01 RX ADMIN — SODIUM CHLORIDE 1000 MILLILITER(S): 9 INJECTION INTRAMUSCULAR; INTRAVENOUS; SUBCUTANEOUS at 07:17

## 2017-01-01 RX ADMIN — FINASTERIDE 5 MILLIGRAM(S): 5 TABLET, FILM COATED ORAL at 11:50

## 2017-01-01 RX ADMIN — MORPHINE SULFATE 2 MILLIGRAM(S): 50 CAPSULE, EXTENDED RELEASE ORAL at 21:33

## 2017-01-01 RX ADMIN — Medication 1 TABLET(S): at 11:06

## 2017-01-01 RX ADMIN — TAMSULOSIN HYDROCHLORIDE 0.4 MILLIGRAM(S): 0.4 CAPSULE ORAL at 21:55

## 2017-01-01 RX ADMIN — SIMVASTATIN 20 MILLIGRAM(S): 20 TABLET, FILM COATED ORAL at 21:55

## 2017-01-01 RX ADMIN — Medication 20 MILLIGRAM(S): at 05:33

## 2017-01-01 RX ADMIN — MORPHINE SULFATE 2 MILLIGRAM(S): 50 CAPSULE, EXTENDED RELEASE ORAL at 11:43

## 2017-01-01 RX ADMIN — SCOPALAMINE 1.5 MILLIGRAM(S): 1 PATCH, EXTENDED RELEASE TRANSDERMAL at 12:41

## 2017-01-01 RX ADMIN — MORPHINE SULFATE 2 MILLIGRAM(S): 50 CAPSULE, EXTENDED RELEASE ORAL at 12:43

## 2017-01-01 RX ADMIN — Medication 0.25 MILLIGRAM(S): at 00:24

## 2017-01-01 RX ADMIN — MORPHINE SULFATE 2 MILLIGRAM(S): 50 CAPSULE, EXTENDED RELEASE ORAL at 07:20

## 2017-01-01 RX ADMIN — PIPERACILLIN AND TAZOBACTAM 25 GRAM(S): 4; .5 INJECTION, POWDER, LYOPHILIZED, FOR SOLUTION INTRAVENOUS at 05:34

## 2017-01-01 RX ADMIN — TAMSULOSIN HYDROCHLORIDE 0.4 MILLIGRAM(S): 0.4 CAPSULE ORAL at 22:28

## 2017-01-01 RX ADMIN — AMIODARONE HYDROCHLORIDE 200 MILLIGRAM(S): 400 TABLET ORAL at 10:54

## 2017-01-01 RX ADMIN — RISPERIDONE 1 MILLIGRAM(S): 4 TABLET ORAL at 17:21

## 2017-01-01 RX ADMIN — LACTULOSE 10 GRAM(S): 10 SOLUTION ORAL at 11:18

## 2017-01-01 RX ADMIN — PANTOPRAZOLE SODIUM 40 MILLIGRAM(S): 20 TABLET, DELAYED RELEASE ORAL at 08:08

## 2017-01-01 RX ADMIN — Medication 325 MILLIGRAM(S): at 10:39

## 2017-01-01 RX ADMIN — CARVEDILOL PHOSPHATE 3.12 MILLIGRAM(S): 80 CAPSULE, EXTENDED RELEASE ORAL at 17:41

## 2017-01-01 RX ADMIN — MORPHINE SULFATE 2 MILLIGRAM(S): 50 CAPSULE, EXTENDED RELEASE ORAL at 18:43

## 2017-01-01 RX ADMIN — PANTOPRAZOLE SODIUM 40 MILLIGRAM(S): 20 TABLET, DELAYED RELEASE ORAL at 11:19

## 2017-01-01 RX ADMIN — MORPHINE SULFATE 2 MILLIGRAM(S): 50 CAPSULE, EXTENDED RELEASE ORAL at 12:35

## 2017-01-01 RX ADMIN — HEPARIN SODIUM 5000 UNIT(S): 5000 INJECTION INTRAVENOUS; SUBCUTANEOUS at 05:54

## 2017-01-01 RX ADMIN — Medication 650 MILLIGRAM(S): at 10:50

## 2017-01-01 RX ADMIN — Medication 1 TABLET(S): at 11:51

## 2017-01-01 RX ADMIN — Medication 20 MILLIGRAM(S): at 09:45

## 2017-01-01 RX ADMIN — ALBUTEROL 2.5 MILLIGRAM(S): 90 AEROSOL, METERED ORAL at 00:55

## 2017-01-01 RX ADMIN — Medication 1 TABLET(S): at 15:52

## 2017-01-01 RX ADMIN — Medication 10 MILLIGRAM(S): at 22:25

## 2017-01-01 RX ADMIN — SCOPALAMINE 1.5 MILLIGRAM(S): 1 PATCH, EXTENDED RELEASE TRANSDERMAL at 12:43

## 2017-01-01 RX ADMIN — FINASTERIDE 5 MILLIGRAM(S): 5 TABLET, FILM COATED ORAL at 11:27

## 2017-01-01 RX ADMIN — Medication 20 MILLIGRAM(S): at 07:19

## 2017-01-01 RX ADMIN — MORPHINE SULFATE 2 MILLIGRAM(S): 50 CAPSULE, EXTENDED RELEASE ORAL at 13:42

## 2017-01-01 RX ADMIN — MORPHINE SULFATE 2 MILLIGRAM(S): 50 CAPSULE, EXTENDED RELEASE ORAL at 18:35

## 2017-01-01 RX ADMIN — MORPHINE SULFATE 2 MILLIGRAM(S): 50 CAPSULE, EXTENDED RELEASE ORAL at 02:50

## 2017-01-01 RX ADMIN — MEROPENEM 200 MILLIGRAM(S): 1 INJECTION INTRAVENOUS at 13:43

## 2017-01-01 RX ADMIN — PANTOPRAZOLE SODIUM 40 MILLIGRAM(S): 20 TABLET, DELAYED RELEASE ORAL at 07:39

## 2017-01-01 RX ADMIN — Medication 0.5 MILLIGRAM(S): at 16:03

## 2017-01-01 RX ADMIN — HEPARIN SODIUM 5000 UNIT(S): 5000 INJECTION INTRAVENOUS; SUBCUTANEOUS at 17:16

## 2017-01-01 RX ADMIN — MORPHINE SULFATE 2 MILLIGRAM(S): 50 CAPSULE, EXTENDED RELEASE ORAL at 08:32

## 2017-01-01 RX ADMIN — MORPHINE SULFATE 2 MILLIGRAM(S): 50 CAPSULE, EXTENDED RELEASE ORAL at 17:59

## 2017-01-01 RX ADMIN — SIMVASTATIN 20 MILLIGRAM(S): 20 TABLET, FILM COATED ORAL at 21:47

## 2017-01-01 RX ADMIN — CARVEDILOL PHOSPHATE 6.25 MILLIGRAM(S): 80 CAPSULE, EXTENDED RELEASE ORAL at 17:31

## 2017-01-01 RX ADMIN — MORPHINE SULFATE 2 MILLIGRAM(S): 50 CAPSULE, EXTENDED RELEASE ORAL at 06:32

## 2017-01-01 RX ADMIN — MEROPENEM 200 MILLIGRAM(S): 1 INJECTION INTRAVENOUS at 21:48

## 2017-01-01 RX ADMIN — ENOXAPARIN SODIUM 40 MILLIGRAM(S): 100 INJECTION SUBCUTANEOUS at 11:48

## 2017-01-01 RX ADMIN — MORPHINE SULFATE 2 MILLIGRAM(S): 50 CAPSULE, EXTENDED RELEASE ORAL at 17:12

## 2017-01-01 RX ADMIN — Medication 650 MILLIGRAM(S): at 13:42

## 2017-01-01 RX ADMIN — ALBUTEROL 2.5 MILLIGRAM(S): 90 AEROSOL, METERED ORAL at 17:04

## 2017-01-01 RX ADMIN — CARVEDILOL PHOSPHATE 12.5 MILLIGRAM(S): 80 CAPSULE, EXTENDED RELEASE ORAL at 17:04

## 2017-01-01 RX ADMIN — RISPERIDONE 1 MILLIGRAM(S): 4 TABLET ORAL at 17:09

## 2017-01-01 RX ADMIN — FINASTERIDE 5 MILLIGRAM(S): 5 TABLET, FILM COATED ORAL at 11:40

## 2017-01-01 RX ADMIN — MORPHINE SULFATE 2 MILLIGRAM(S): 50 CAPSULE, EXTENDED RELEASE ORAL at 11:25

## 2017-01-01 RX ADMIN — Medication 650 MILLIGRAM(S): at 20:15

## 2017-01-01 RX ADMIN — Medication 650 MILLIGRAM(S): at 09:10

## 2017-01-01 RX ADMIN — MORPHINE SULFATE 2 MILLIGRAM(S): 50 CAPSULE, EXTENDED RELEASE ORAL at 07:40

## 2017-01-01 RX ADMIN — MORPHINE SULFATE 2 MILLIGRAM(S): 50 CAPSULE, EXTENDED RELEASE ORAL at 12:08

## 2017-01-01 RX ADMIN — FINASTERIDE 5 MILLIGRAM(S): 5 TABLET, FILM COATED ORAL at 11:26

## 2017-01-01 RX ADMIN — MORPHINE SULFATE 2 MILLIGRAM(S): 50 CAPSULE, EXTENDED RELEASE ORAL at 11:45

## 2017-01-01 RX ADMIN — Medication 81 MILLIGRAM(S): at 11:13

## 2017-01-01 RX ADMIN — Medication 0.25 MILLIGRAM(S): at 17:09

## 2017-01-01 RX ADMIN — MORPHINE SULFATE 2 MILLIGRAM(S): 50 CAPSULE, EXTENDED RELEASE ORAL at 05:12

## 2017-01-01 RX ADMIN — MORPHINE SULFATE 2 MILLIGRAM(S): 50 CAPSULE, EXTENDED RELEASE ORAL at 13:55

## 2017-01-01 RX ADMIN — MORPHINE SULFATE 2 MILLIGRAM(S): 50 CAPSULE, EXTENDED RELEASE ORAL at 02:30

## 2017-01-01 RX ADMIN — Medication 325 MILLIGRAM(S): at 22:25

## 2017-01-01 RX ADMIN — FINASTERIDE 5 MILLIGRAM(S): 5 TABLET, FILM COATED ORAL at 11:18

## 2017-01-01 RX ADMIN — ALBUTEROL 2.5 MILLIGRAM(S): 90 AEROSOL, METERED ORAL at 05:27

## 2017-01-01 RX ADMIN — ENOXAPARIN SODIUM 60 MILLIGRAM(S): 100 INJECTION SUBCUTANEOUS at 17:25

## 2017-01-01 RX ADMIN — FINASTERIDE 5 MILLIGRAM(S): 5 TABLET, FILM COATED ORAL at 11:06

## 2017-01-01 RX ADMIN — Medication 650 MILLIGRAM(S): at 11:52

## 2017-01-01 RX ADMIN — Medication 325 MILLIGRAM(S): at 17:40

## 2017-01-01 RX ADMIN — SIMVASTATIN 20 MILLIGRAM(S): 20 TABLET, FILM COATED ORAL at 21:34

## 2017-01-01 RX ADMIN — SCOPALAMINE 1.5 MILLIGRAM(S): 1 PATCH, EXTENDED RELEASE TRANSDERMAL at 12:40

## 2017-01-01 RX ADMIN — TAMSULOSIN HYDROCHLORIDE 0.4 MILLIGRAM(S): 0.4 CAPSULE ORAL at 22:08

## 2017-01-01 RX ADMIN — Medication 250 MILLIGRAM(S): at 07:17

## 2017-01-01 RX ADMIN — LACTULOSE 10 GRAM(S): 10 SOLUTION ORAL at 11:51

## 2017-01-01 RX ADMIN — ALBUTEROL 2.5 MILLIGRAM(S): 90 AEROSOL, METERED ORAL at 23:25

## 2017-01-01 RX ADMIN — MORPHINE SULFATE 2 MILLIGRAM(S): 50 CAPSULE, EXTENDED RELEASE ORAL at 23:41

## 2017-01-01 RX ADMIN — LACTULOSE 10 GRAM(S): 10 SOLUTION ORAL at 11:40

## 2017-01-01 RX ADMIN — Medication 0.5 MILLIGRAM(S): at 06:36

## 2017-01-01 RX ADMIN — PIPERACILLIN AND TAZOBACTAM 25 GRAM(S): 4; .5 INJECTION, POWDER, LYOPHILIZED, FOR SOLUTION INTRAVENOUS at 13:34

## 2017-01-01 RX ADMIN — ALBUTEROL 2.5 MILLIGRAM(S): 90 AEROSOL, METERED ORAL at 23:11

## 2017-01-01 RX ADMIN — MORPHINE SULFATE 2 MILLIGRAM(S): 50 CAPSULE, EXTENDED RELEASE ORAL at 18:05

## 2017-01-01 RX ADMIN — Medication 400 MILLIGRAM(S): at 13:35

## 2017-01-01 RX ADMIN — Medication 650 MILLIGRAM(S): at 08:10

## 2017-01-01 RX ADMIN — Medication 325 MILLIGRAM(S): at 12:41

## 2017-01-01 RX ADMIN — Medication 650 MILLIGRAM(S): at 16:30

## 2017-01-01 RX ADMIN — ALBUTEROL 2.5 MILLIGRAM(S): 90 AEROSOL, METERED ORAL at 11:51

## 2017-01-01 RX ADMIN — Medication 1 TABLET(S): at 11:41

## 2017-01-01 RX ADMIN — SODIUM CHLORIDE 50 MILLILITER(S): 9 INJECTION, SOLUTION INTRAVENOUS at 13:13

## 2017-01-01 RX ADMIN — Medication 650 MILLIGRAM(S): at 21:56

## 2017-01-01 RX ADMIN — Medication 81 MILLIGRAM(S): at 11:51

## 2017-01-01 RX ADMIN — Medication 650 MILLIGRAM(S): at 06:07

## 2017-01-01 RX ADMIN — MEROPENEM 200 MILLIGRAM(S): 1 INJECTION INTRAVENOUS at 14:09

## 2017-01-01 RX ADMIN — SODIUM CHLORIDE 50 MILLILITER(S): 9 INJECTION, SOLUTION INTRAVENOUS at 05:33

## 2017-01-01 RX ADMIN — PANTOPRAZOLE SODIUM 40 MILLIGRAM(S): 20 TABLET, DELAYED RELEASE ORAL at 05:56

## 2017-01-01 RX ADMIN — PIPERACILLIN AND TAZOBACTAM 25 GRAM(S): 4; .5 INJECTION, POWDER, LYOPHILIZED, FOR SOLUTION INTRAVENOUS at 21:51

## 2017-01-01 RX ADMIN — MORPHINE SULFATE 2 MILLIGRAM(S): 50 CAPSULE, EXTENDED RELEASE ORAL at 17:34

## 2017-01-01 RX ADMIN — Medication 150 MILLIGRAM(S): at 17:42

## 2017-01-01 RX ADMIN — SODIUM CHLORIDE 50 MILLILITER(S): 9 INJECTION, SOLUTION INTRAVENOUS at 11:51

## 2017-01-01 RX ADMIN — Medication 0.5 MILLIGRAM(S): at 12:23

## 2017-01-01 RX ADMIN — PANTOPRAZOLE SODIUM 40 MILLIGRAM(S): 20 TABLET, DELAYED RELEASE ORAL at 15:52

## 2017-01-01 RX ADMIN — LACTULOSE 10 GRAM(S): 10 SOLUTION ORAL at 11:49

## 2017-01-01 RX ADMIN — ALBUTEROL 2.5 MILLIGRAM(S): 90 AEROSOL, METERED ORAL at 23:20

## 2017-01-01 RX ADMIN — RISPERIDONE 1 MILLIGRAM(S): 4 TABLET ORAL at 05:40

## 2017-01-01 RX ADMIN — ALBUTEROL 2.5 MILLIGRAM(S): 90 AEROSOL, METERED ORAL at 17:05

## 2017-01-01 RX ADMIN — MORPHINE SULFATE 2 MILLIGRAM(S): 50 CAPSULE, EXTENDED RELEASE ORAL at 11:49

## 2017-01-01 RX ADMIN — MORPHINE SULFATE 2 MILLIGRAM(S): 50 CAPSULE, EXTENDED RELEASE ORAL at 21:06

## 2017-01-01 RX ADMIN — Medication 1 TABLET(S): at 11:48

## 2017-01-01 RX ADMIN — Medication 650 MILLIGRAM(S): at 14:47

## 2017-01-01 RX ADMIN — MORPHINE SULFATE 2 MILLIGRAM(S): 50 CAPSULE, EXTENDED RELEASE ORAL at 00:02

## 2017-01-01 RX ADMIN — Medication 650 MILLIGRAM(S): at 21:34

## 2017-01-01 RX ADMIN — Medication 250 MILLIGRAM(S): at 05:03

## 2017-01-01 RX ADMIN — Medication 0.25 MILLIGRAM(S): at 16:34

## 2017-01-01 RX ADMIN — SODIUM CHLORIDE 50 MILLILITER(S): 9 INJECTION, SOLUTION INTRAVENOUS at 12:04

## 2017-01-01 RX ADMIN — MORPHINE SULFATE 2 MILLIGRAM(S): 50 CAPSULE, EXTENDED RELEASE ORAL at 01:08

## 2017-01-01 RX ADMIN — ALBUTEROL 2.5 MILLIGRAM(S): 90 AEROSOL, METERED ORAL at 17:21

## 2017-01-01 RX ADMIN — MORPHINE SULFATE 2 MILLIGRAM(S): 50 CAPSULE, EXTENDED RELEASE ORAL at 01:35

## 2017-01-01 RX ADMIN — Medication 650 MILLIGRAM(S): at 16:37

## 2017-01-01 RX ADMIN — Medication 1 TABLET(S): at 11:27

## 2017-01-01 RX ADMIN — MORPHINE SULFATE 2 MILLIGRAM(S): 50 CAPSULE, EXTENDED RELEASE ORAL at 11:00

## 2017-01-01 RX ADMIN — Medication 650 MILLIGRAM(S): at 07:17

## 2017-01-01 RX ADMIN — MORPHINE SULFATE 2 MILLIGRAM(S): 50 CAPSULE, EXTENDED RELEASE ORAL at 12:00

## 2017-01-01 RX ADMIN — SIMVASTATIN 20 MILLIGRAM(S): 20 TABLET, FILM COATED ORAL at 21:16

## 2017-01-01 RX ADMIN — PIPERACILLIN AND TAZOBACTAM 25 GRAM(S): 4; .5 INJECTION, POWDER, LYOPHILIZED, FOR SOLUTION INTRAVENOUS at 05:43

## 2017-01-01 RX ADMIN — LISINOPRIL 10 MILLIGRAM(S): 2.5 TABLET ORAL at 17:13

## 2017-01-01 RX ADMIN — ENOXAPARIN SODIUM 60 MILLIGRAM(S): 100 INJECTION SUBCUTANEOUS at 05:32

## 2017-01-01 RX ADMIN — TAMSULOSIN HYDROCHLORIDE 0.4 MILLIGRAM(S): 0.4 CAPSULE ORAL at 21:45

## 2017-01-01 RX ADMIN — PANTOPRAZOLE SODIUM 40 MILLIGRAM(S): 20 TABLET, DELAYED RELEASE ORAL at 05:19

## 2017-01-01 RX ADMIN — MORPHINE SULFATE 2 MILLIGRAM(S): 50 CAPSULE, EXTENDED RELEASE ORAL at 07:17

## 2017-01-01 RX ADMIN — MORPHINE SULFATE 2 MILLIGRAM(S): 50 CAPSULE, EXTENDED RELEASE ORAL at 06:46

## 2017-01-01 RX ADMIN — Medication 650 MILLIGRAM(S): at 01:14

## 2017-01-01 RX ADMIN — SODIUM CHLORIDE 500 MILLILITER(S): 9 INJECTION INTRAMUSCULAR; INTRAVENOUS; SUBCUTANEOUS at 10:30

## 2017-01-01 RX ADMIN — ALBUTEROL 2.5 MILLIGRAM(S): 90 AEROSOL, METERED ORAL at 17:11

## 2017-01-01 RX ADMIN — Medication 650 MILLIGRAM(S): at 08:01

## 2017-01-01 RX ADMIN — Medication 650 MILLIGRAM(S): at 21:06

## 2017-01-01 RX ADMIN — AMIODARONE HYDROCHLORIDE 16.67 MG/MIN: 400 TABLET ORAL at 22:08

## 2017-01-01 RX ADMIN — ALBUTEROL 2.5 MILLIGRAM(S): 90 AEROSOL, METERED ORAL at 17:01

## 2017-01-01 RX ADMIN — MEROPENEM 200 MILLIGRAM(S): 1 INJECTION INTRAVENOUS at 05:12

## 2017-01-01 RX ADMIN — Medication 400 MILLIGRAM(S): at 14:22

## 2017-01-01 RX ADMIN — ROBINUL 0.2 MILLIGRAM(S): 0.2 INJECTION INTRAMUSCULAR; INTRAVENOUS at 21:33

## 2017-01-01 RX ADMIN — MORPHINE SULFATE 2 MILLIGRAM(S): 50 CAPSULE, EXTENDED RELEASE ORAL at 13:00

## 2017-01-01 RX ADMIN — CLOPIDOGREL BISULFATE 75 MILLIGRAM(S): 75 TABLET, FILM COATED ORAL at 11:40

## 2017-01-01 RX ADMIN — Medication 0.25 MILLIGRAM(S): at 05:33

## 2017-01-01 RX ADMIN — PIPERACILLIN AND TAZOBACTAM 25 GRAM(S): 4; .5 INJECTION, POWDER, LYOPHILIZED, FOR SOLUTION INTRAVENOUS at 05:32

## 2017-01-01 RX ADMIN — ROBINUL 0.2 MILLIGRAM(S): 0.2 INJECTION INTRAMUSCULAR; INTRAVENOUS at 11:22

## 2017-01-01 RX ADMIN — MEROPENEM 200 MILLIGRAM(S): 1 INJECTION INTRAVENOUS at 22:00

## 2017-01-01 RX ADMIN — ENOXAPARIN SODIUM 60 MILLIGRAM(S): 100 INJECTION SUBCUTANEOUS at 17:42

## 2017-01-01 RX ADMIN — MEROPENEM 200 MILLIGRAM(S): 1 INJECTION INTRAVENOUS at 05:32

## 2017-01-01 RX ADMIN — PANTOPRAZOLE SODIUM 40 MILLIGRAM(S): 20 TABLET, DELAYED RELEASE ORAL at 02:03

## 2017-01-01 RX ADMIN — PIPERACILLIN AND TAZOBACTAM 25 GRAM(S): 4; .5 INJECTION, POWDER, LYOPHILIZED, FOR SOLUTION INTRAVENOUS at 13:09

## 2017-01-01 RX ADMIN — Medication 650 MILLIGRAM(S): at 16:00

## 2017-01-01 RX ADMIN — Medication 650 MILLIGRAM(S): at 21:59

## 2017-01-01 RX ADMIN — SERTRALINE 100 MILLIGRAM(S): 25 TABLET, FILM COATED ORAL at 11:40

## 2017-01-01 RX ADMIN — ENOXAPARIN SODIUM 40 MILLIGRAM(S): 100 INJECTION SUBCUTANEOUS at 14:06

## 2017-01-01 RX ADMIN — Medication 400 MILLIGRAM(S): at 15:22

## 2017-01-01 RX ADMIN — PIPERACILLIN AND TAZOBACTAM 25 GRAM(S): 4; .5 INJECTION, POWDER, LYOPHILIZED, FOR SOLUTION INTRAVENOUS at 22:07

## 2017-01-01 RX ADMIN — MORPHINE SULFATE 2 MILLIGRAM(S): 50 CAPSULE, EXTENDED RELEASE ORAL at 06:34

## 2017-01-01 RX ADMIN — Medication 0.25 MILLIGRAM(S): at 17:16

## 2017-01-01 RX ADMIN — Medication 325 MILLIGRAM(S): at 11:27

## 2017-01-01 RX ADMIN — Medication 0.5 MILLIGRAM(S): at 01:49

## 2017-01-01 RX ADMIN — PIPERACILLIN AND TAZOBACTAM 25 GRAM(S): 4; .5 INJECTION, POWDER, LYOPHILIZED, FOR SOLUTION INTRAVENOUS at 15:52

## 2017-01-01 RX ADMIN — TAMSULOSIN HYDROCHLORIDE 0.4 MILLIGRAM(S): 0.4 CAPSULE ORAL at 22:14

## 2017-01-01 RX ADMIN — MORPHINE SULFATE 2 MILLIGRAM(S): 50 CAPSULE, EXTENDED RELEASE ORAL at 21:50

## 2017-01-01 RX ADMIN — Medication 81 MILLIGRAM(S): at 11:48

## 2017-01-01 RX ADMIN — Medication 325 MILLIGRAM(S): at 23:40

## 2017-01-01 RX ADMIN — Medication 650 MILLIGRAM(S): at 13:19

## 2017-01-01 RX ADMIN — PIPERACILLIN AND TAZOBACTAM 25 GRAM(S): 4; .5 INJECTION, POWDER, LYOPHILIZED, FOR SOLUTION INTRAVENOUS at 06:06

## 2017-01-01 RX ADMIN — SERTRALINE 100 MILLIGRAM(S): 25 TABLET, FILM COATED ORAL at 11:29

## 2017-01-01 RX ADMIN — Medication 0.5 MILLIGRAM(S): at 14:45

## 2017-01-01 RX ADMIN — TAMSULOSIN HYDROCHLORIDE 0.4 MILLIGRAM(S): 0.4 CAPSULE ORAL at 21:52

## 2017-01-01 RX ADMIN — ALBUTEROL 2.5 MILLIGRAM(S): 90 AEROSOL, METERED ORAL at 05:25

## 2017-01-01 RX ADMIN — Medication 10 MILLIGRAM(S): at 22:13

## 2017-01-01 RX ADMIN — MEROPENEM 200 MILLIGRAM(S): 1 INJECTION INTRAVENOUS at 21:16

## 2017-01-01 RX ADMIN — MORPHINE SULFATE 2 MILLIGRAM(S): 50 CAPSULE, EXTENDED RELEASE ORAL at 19:30

## 2017-01-01 RX ADMIN — SODIUM CHLORIDE 50 MILLILITER(S): 9 INJECTION, SOLUTION INTRAVENOUS at 15:46

## 2017-01-01 RX ADMIN — Medication 81 MILLIGRAM(S): at 11:40

## 2017-01-01 RX ADMIN — MORPHINE SULFATE 2 MILLIGRAM(S): 50 CAPSULE, EXTENDED RELEASE ORAL at 12:15

## 2017-01-01 RX ADMIN — ALBUTEROL 2.5 MILLIGRAM(S): 90 AEROSOL, METERED ORAL at 19:07

## 2017-01-01 RX ADMIN — Medication 81 MILLIGRAM(S): at 11:43

## 2017-01-01 RX ADMIN — ALBUTEROL 2.5 MILLIGRAM(S): 90 AEROSOL, METERED ORAL at 11:34

## 2017-01-01 RX ADMIN — Medication 0.5 MILLIGRAM(S): at 12:38

## 2017-01-01 RX ADMIN — SODIUM CHLORIDE 50 MILLILITER(S): 9 INJECTION, SOLUTION INTRAVENOUS at 17:41

## 2017-01-01 RX ADMIN — ALBUTEROL 2.5 MILLIGRAM(S): 90 AEROSOL, METERED ORAL at 11:19

## 2017-01-01 RX ADMIN — FINASTERIDE 5 MILLIGRAM(S): 5 TABLET, FILM COATED ORAL at 11:51

## 2017-01-01 RX ADMIN — Medication 650 MILLIGRAM(S): at 06:16

## 2017-01-01 RX ADMIN — MORPHINE SULFATE 2 MILLIGRAM(S): 50 CAPSULE, EXTENDED RELEASE ORAL at 10:15

## 2017-01-01 RX ADMIN — AMIODARONE HYDROCHLORIDE 200 MILLIGRAM(S): 400 TABLET ORAL at 05:30

## 2017-01-01 RX ADMIN — Medication 400 MILLIGRAM(S): at 05:14

## 2017-01-01 RX ADMIN — CLOPIDOGREL BISULFATE 75 MILLIGRAM(S): 75 TABLET, FILM COATED ORAL at 14:26

## 2017-01-01 RX ADMIN — PIPERACILLIN AND TAZOBACTAM 25 GRAM(S): 4; .5 INJECTION, POWDER, LYOPHILIZED, FOR SOLUTION INTRAVENOUS at 05:23

## 2017-01-01 RX ADMIN — CARVEDILOL PHOSPHATE 12.5 MILLIGRAM(S): 80 CAPSULE, EXTENDED RELEASE ORAL at 05:57

## 2017-01-01 RX ADMIN — RISPERIDONE 1 MILLIGRAM(S): 4 TABLET ORAL at 05:43

## 2017-01-01 RX ADMIN — MORPHINE SULFATE 2 MILLIGRAM(S): 50 CAPSULE, EXTENDED RELEASE ORAL at 09:55

## 2017-01-01 RX ADMIN — CARVEDILOL PHOSPHATE 3.12 MILLIGRAM(S): 80 CAPSULE, EXTENDED RELEASE ORAL at 05:31

## 2017-01-01 RX ADMIN — PANTOPRAZOLE SODIUM 40 MILLIGRAM(S): 20 TABLET, DELAYED RELEASE ORAL at 06:04

## 2017-01-01 RX ADMIN — ALBUTEROL 2.5 MILLIGRAM(S): 90 AEROSOL, METERED ORAL at 11:25

## 2017-01-01 RX ADMIN — TAMSULOSIN HYDROCHLORIDE 0.4 MILLIGRAM(S): 0.4 CAPSULE ORAL at 21:34

## 2017-01-01 RX ADMIN — AMIODARONE HYDROCHLORIDE 200 MILLIGRAM(S): 400 TABLET ORAL at 05:12

## 2017-01-01 RX ADMIN — Medication 81 MILLIGRAM(S): at 11:41

## 2017-01-01 RX ADMIN — Medication 400 MILLIGRAM(S): at 06:00

## 2017-01-01 RX ADMIN — ROBINUL 0.2 MILLIGRAM(S): 0.2 INJECTION INTRAMUSCULAR; INTRAVENOUS at 02:04

## 2017-01-01 RX ADMIN — Medication 650 MILLIGRAM(S): at 22:28

## 2017-01-01 RX ADMIN — Medication 0.5 MILLIGRAM(S): at 18:00

## 2017-01-01 RX ADMIN — Medication 650 MILLIGRAM(S): at 05:11

## 2017-01-01 RX ADMIN — LACTULOSE 10 GRAM(S): 10 SOLUTION ORAL at 11:28

## 2017-01-01 RX ADMIN — Medication 650 MILLIGRAM(S): at 21:13

## 2017-01-01 RX ADMIN — Medication 1 TABLET(S): at 11:42

## 2017-01-01 RX ADMIN — Medication 0.5 MILLIGRAM(S): at 17:46

## 2017-01-01 RX ADMIN — PANTOPRAZOLE SODIUM 40 MILLIGRAM(S): 20 TABLET, DELAYED RELEASE ORAL at 05:12

## 2017-01-01 RX ADMIN — MORPHINE SULFATE 2 MILLIGRAM(S): 50 CAPSULE, EXTENDED RELEASE ORAL at 12:27

## 2017-01-01 RX ADMIN — PANTOPRAZOLE SODIUM 40 MILLIGRAM(S): 20 TABLET, DELAYED RELEASE ORAL at 11:27

## 2017-01-01 RX ADMIN — ALBUTEROL 2.5 MILLIGRAM(S): 90 AEROSOL, METERED ORAL at 11:30

## 2017-01-01 RX ADMIN — MORPHINE SULFATE 2 MILLIGRAM(S): 50 CAPSULE, EXTENDED RELEASE ORAL at 17:50

## 2017-01-01 RX ADMIN — LACTULOSE 10 GRAM(S): 10 SOLUTION ORAL at 17:12

## 2017-01-01 RX ADMIN — ALBUTEROL 2.5 MILLIGRAM(S): 90 AEROSOL, METERED ORAL at 17:15

## 2017-01-01 RX ADMIN — ALBUTEROL 2.5 MILLIGRAM(S): 90 AEROSOL, METERED ORAL at 11:20

## 2017-01-01 RX ADMIN — Medication 650 MILLIGRAM(S): at 05:48

## 2017-01-01 RX ADMIN — SERTRALINE 100 MILLIGRAM(S): 25 TABLET, FILM COATED ORAL at 11:13

## 2017-01-01 RX ADMIN — Medication 0.25 MILLIGRAM(S): at 17:24

## 2017-01-01 RX ADMIN — ALBUTEROL 2.5 MILLIGRAM(S): 90 AEROSOL, METERED ORAL at 23:29

## 2017-01-01 RX ADMIN — Medication 12.5 MILLIGRAM(S): at 10:29

## 2017-01-01 RX ADMIN — Medication 650 MILLIGRAM(S): at 06:25

## 2017-01-01 RX ADMIN — SIMVASTATIN 20 MILLIGRAM(S): 20 TABLET, FILM COATED ORAL at 01:15

## 2017-01-01 RX ADMIN — Medication 0.5 MILLIGRAM(S): at 20:01

## 2017-01-01 RX ADMIN — TAMSULOSIN HYDROCHLORIDE 0.4 MILLIGRAM(S): 0.4 CAPSULE ORAL at 21:37

## 2017-01-01 RX ADMIN — LACTULOSE 10 GRAM(S): 10 SOLUTION ORAL at 11:14

## 2017-01-01 RX ADMIN — Medication 650 MILLIGRAM(S): at 06:01

## 2017-01-01 RX ADMIN — Medication 325 MILLIGRAM(S): at 17:11

## 2017-01-01 RX ADMIN — Medication 650 MILLIGRAM(S): at 06:09

## 2017-01-01 RX ADMIN — AMIODARONE HYDROCHLORIDE 200 MILLIGRAM(S): 400 TABLET ORAL at 05:14

## 2017-01-01 RX ADMIN — Medication 650 MILLIGRAM(S): at 23:20

## 2017-01-01 RX ADMIN — HEPARIN SODIUM 5000 UNIT(S): 5000 INJECTION INTRAVENOUS; SUBCUTANEOUS at 05:43

## 2017-01-01 RX ADMIN — MORPHINE SULFATE 2 MILLIGRAM(S): 50 CAPSULE, EXTENDED RELEASE ORAL at 06:04

## 2017-01-01 RX ADMIN — TAMSULOSIN HYDROCHLORIDE 0.4 MILLIGRAM(S): 0.4 CAPSULE ORAL at 21:16

## 2017-01-01 RX ADMIN — Medication 20 MILLIGRAM(S): at 10:54

## 2017-01-01 RX ADMIN — Medication 650 MILLIGRAM(S): at 20:33

## 2017-01-01 RX ADMIN — AMIODARONE HYDROCHLORIDE 200 MILLIGRAM(S): 400 TABLET ORAL at 05:34

## 2017-01-01 RX ADMIN — CARVEDILOL PHOSPHATE 3.12 MILLIGRAM(S): 80 CAPSULE, EXTENDED RELEASE ORAL at 17:11

## 2017-01-01 RX ADMIN — MORPHINE SULFATE 2 MILLIGRAM(S): 50 CAPSULE, EXTENDED RELEASE ORAL at 05:54

## 2017-01-01 RX ADMIN — Medication 0.5 MILLIGRAM(S): at 03:59

## 2017-01-01 RX ADMIN — Medication 81 MILLIGRAM(S): at 11:07

## 2017-01-01 RX ADMIN — MORPHINE SULFATE 2 MILLIGRAM(S): 50 CAPSULE, EXTENDED RELEASE ORAL at 00:08

## 2017-01-01 RX ADMIN — PIPERACILLIN AND TAZOBACTAM 25 GRAM(S): 4; .5 INJECTION, POWDER, LYOPHILIZED, FOR SOLUTION INTRAVENOUS at 13:40

## 2017-01-01 RX ADMIN — MORPHINE SULFATE 2 MILLIGRAM(S): 50 CAPSULE, EXTENDED RELEASE ORAL at 10:33

## 2017-01-01 RX ADMIN — ROBINUL 0.2 MILLIGRAM(S): 0.2 INJECTION INTRAMUSCULAR; INTRAVENOUS at 18:55

## 2017-01-01 RX ADMIN — Medication 81 MILLIGRAM(S): at 11:50

## 2017-01-01 RX ADMIN — MORPHINE SULFATE 2 MILLIGRAM(S): 50 CAPSULE, EXTENDED RELEASE ORAL at 13:45

## 2017-01-01 RX ADMIN — MORPHINE SULFATE 2 MILLIGRAM(S): 50 CAPSULE, EXTENDED RELEASE ORAL at 00:38

## 2017-01-01 RX ADMIN — ENOXAPARIN SODIUM 40 MILLIGRAM(S): 100 INJECTION SUBCUTANEOUS at 11:26

## 2017-01-01 RX ADMIN — FINASTERIDE 5 MILLIGRAM(S): 5 TABLET, FILM COATED ORAL at 11:43

## 2017-01-01 RX ADMIN — AMIODARONE HYDROCHLORIDE 16.67 MG/MIN: 400 TABLET ORAL at 00:56

## 2017-01-01 RX ADMIN — MORPHINE SULFATE 2 MILLIGRAM(S): 50 CAPSULE, EXTENDED RELEASE ORAL at 04:44

## 2017-01-01 RX ADMIN — LACTULOSE 10 GRAM(S): 10 SOLUTION ORAL at 11:42

## 2017-01-01 RX ADMIN — Medication 0.5 MILLIGRAM(S): at 17:20

## 2017-01-01 RX ADMIN — Medication 150 MILLIGRAM(S): at 06:17

## 2017-01-01 RX ADMIN — HEPARIN SODIUM 5000 UNIT(S): 5000 INJECTION INTRAVENOUS; SUBCUTANEOUS at 17:13

## 2017-01-01 RX ADMIN — Medication 0.5 MILLIGRAM(S): at 05:35

## 2017-01-01 RX ADMIN — PIPERACILLIN AND TAZOBACTAM 25 GRAM(S): 4; .5 INJECTION, POWDER, LYOPHILIZED, FOR SOLUTION INTRAVENOUS at 21:13

## 2017-01-01 RX ADMIN — PANTOPRAZOLE SODIUM 40 MILLIGRAM(S): 20 TABLET, DELAYED RELEASE ORAL at 16:55

## 2017-01-01 RX ADMIN — AMIODARONE HYDROCHLORIDE 16.67 MG/MIN: 400 TABLET ORAL at 11:52

## 2017-01-01 RX ADMIN — MORPHINE SULFATE 2 MILLIGRAM(S): 50 CAPSULE, EXTENDED RELEASE ORAL at 05:46

## 2017-01-01 RX ADMIN — PANTOPRAZOLE SODIUM 40 MILLIGRAM(S): 20 TABLET, DELAYED RELEASE ORAL at 05:49

## 2017-01-01 RX ADMIN — PIPERACILLIN AND TAZOBACTAM 25 GRAM(S): 4; .5 INJECTION, POWDER, LYOPHILIZED, FOR SOLUTION INTRAVENOUS at 13:12

## 2017-01-01 RX ADMIN — ALBUTEROL 2.5 MILLIGRAM(S): 90 AEROSOL, METERED ORAL at 05:32

## 2017-01-01 RX ADMIN — PANTOPRAZOLE SODIUM 40 MILLIGRAM(S): 20 TABLET, DELAYED RELEASE ORAL at 11:48

## 2017-01-01 RX ADMIN — ALBUTEROL 2.5 MILLIGRAM(S): 90 AEROSOL, METERED ORAL at 06:21

## 2017-01-01 RX ADMIN — TAMSULOSIN HYDROCHLORIDE 0.4 MILLIGRAM(S): 0.4 CAPSULE ORAL at 21:47

## 2017-01-01 RX ADMIN — MEROPENEM 200 MILLIGRAM(S): 1 INJECTION INTRAVENOUS at 21:33

## 2017-01-01 RX ADMIN — ENOXAPARIN SODIUM 40 MILLIGRAM(S): 100 INJECTION SUBCUTANEOUS at 11:51

## 2017-01-01 RX ADMIN — Medication 0.5 MILLIGRAM(S): at 09:39

## 2017-01-01 RX ADMIN — CARVEDILOL PHOSPHATE 3.12 MILLIGRAM(S): 80 CAPSULE, EXTENDED RELEASE ORAL at 05:34

## 2017-01-01 RX ADMIN — Medication 650 MILLIGRAM(S): at 21:10

## 2017-01-01 RX ADMIN — PIPERACILLIN AND TAZOBACTAM 200 GRAM(S): 4; .5 INJECTION, POWDER, LYOPHILIZED, FOR SOLUTION INTRAVENOUS at 07:17

## 2017-01-01 RX ADMIN — Medication 0.5 MILLIGRAM(S): at 07:15

## 2017-01-01 RX ADMIN — CARVEDILOL PHOSPHATE 12.5 MILLIGRAM(S): 80 CAPSULE, EXTENDED RELEASE ORAL at 17:13

## 2017-01-01 RX ADMIN — MEROPENEM 200 MILLIGRAM(S): 1 INJECTION INTRAVENOUS at 05:48

## 2017-01-01 RX ADMIN — MORPHINE SULFATE 2 MILLIGRAM(S): 50 CAPSULE, EXTENDED RELEASE ORAL at 21:59

## 2017-01-01 RX ADMIN — Medication 400 MILLIGRAM(S): at 23:15

## 2017-01-01 RX ADMIN — Medication 650 MILLIGRAM(S): at 05:57

## 2017-01-01 RX ADMIN — Medication 0.25 MILLIGRAM(S): at 04:44

## 2017-01-01 RX ADMIN — PIPERACILLIN AND TAZOBACTAM 25 GRAM(S): 4; .5 INJECTION, POWDER, LYOPHILIZED, FOR SOLUTION INTRAVENOUS at 21:36

## 2017-01-01 RX ADMIN — MORPHINE SULFATE 2 MILLIGRAM(S): 50 CAPSULE, EXTENDED RELEASE ORAL at 09:45

## 2017-01-01 RX ADMIN — HEPARIN SODIUM 5000 UNIT(S): 5000 INJECTION INTRAVENOUS; SUBCUTANEOUS at 17:21

## 2017-01-01 RX ADMIN — ALBUTEROL 2.5 MILLIGRAM(S): 90 AEROSOL, METERED ORAL at 17:08

## 2017-01-01 RX ADMIN — ENOXAPARIN SODIUM 40 MILLIGRAM(S): 100 INJECTION SUBCUTANEOUS at 11:50

## 2017-01-01 RX ADMIN — Medication 650 MILLIGRAM(S): at 22:00

## 2017-01-01 RX ADMIN — MEROPENEM 200 MILLIGRAM(S): 1 INJECTION INTRAVENOUS at 21:47

## 2017-01-01 RX ADMIN — AMIODARONE HYDROCHLORIDE 16.67 MG/MIN: 400 TABLET ORAL at 01:27

## 2017-01-01 RX ADMIN — MORPHINE SULFATE 2 MILLIGRAM(S): 50 CAPSULE, EXTENDED RELEASE ORAL at 06:13

## 2017-01-01 RX ADMIN — Medication 0.5 MILLIGRAM(S): at 11:34

## 2017-01-01 RX ADMIN — ALBUTEROL 2.5 MILLIGRAM(S): 90 AEROSOL, METERED ORAL at 05:28

## 2017-01-01 RX ADMIN — PANTOPRAZOLE SODIUM 40 MILLIGRAM(S): 20 TABLET, DELAYED RELEASE ORAL at 11:50

## 2017-01-01 RX ADMIN — PANTOPRAZOLE SODIUM 40 MILLIGRAM(S): 20 TABLET, DELAYED RELEASE ORAL at 07:33

## 2017-01-01 RX ADMIN — ALBUTEROL 2.5 MILLIGRAM(S): 90 AEROSOL, METERED ORAL at 06:05

## 2017-01-01 RX ADMIN — MORPHINE SULFATE 2 MILLIGRAM(S): 50 CAPSULE, EXTENDED RELEASE ORAL at 05:34

## 2017-01-01 RX ADMIN — ALBUTEROL 2.5 MILLIGRAM(S): 90 AEROSOL, METERED ORAL at 06:20

## 2017-01-01 RX ADMIN — Medication 81 MILLIGRAM(S): at 11:18

## 2017-01-01 RX ADMIN — MORPHINE SULFATE 2 MILLIGRAM(S): 50 CAPSULE, EXTENDED RELEASE ORAL at 18:04

## 2017-01-01 RX ADMIN — Medication 0.5 MILLIGRAM(S): at 02:20

## 2017-01-01 RX ADMIN — RISPERIDONE 1 MILLIGRAM(S): 4 TABLET ORAL at 17:12

## 2017-01-01 RX ADMIN — PIPERACILLIN AND TAZOBACTAM 25 GRAM(S): 4; .5 INJECTION, POWDER, LYOPHILIZED, FOR SOLUTION INTRAVENOUS at 14:26

## 2017-01-01 RX ADMIN — Medication 20 MILLIGRAM(S): at 05:31

## 2017-01-01 RX ADMIN — LACTULOSE 10 GRAM(S): 10 SOLUTION ORAL at 11:07

## 2017-01-01 RX ADMIN — MEROPENEM 200 MILLIGRAM(S): 1 INJECTION INTRAVENOUS at 05:18

## 2017-01-01 RX ADMIN — ENOXAPARIN SODIUM 60 MILLIGRAM(S): 100 INJECTION SUBCUTANEOUS at 09:46

## 2017-01-01 RX ADMIN — SODIUM CHLORIDE 50 MILLILITER(S): 9 INJECTION, SOLUTION INTRAVENOUS at 01:33

## 2017-01-01 RX ADMIN — HEPARIN SODIUM 5000 UNIT(S): 5000 INJECTION INTRAVENOUS; SUBCUTANEOUS at 05:35

## 2017-01-01 RX ADMIN — ALBUTEROL 2.5 MILLIGRAM(S): 90 AEROSOL, METERED ORAL at 11:27

## 2017-01-01 RX ADMIN — Medication 0.25 MILLIGRAM(S): at 05:40

## 2017-01-01 RX ADMIN — Medication 0.5 MILLIGRAM(S): at 17:34

## 2017-01-01 RX ADMIN — SIMVASTATIN 20 MILLIGRAM(S): 20 TABLET, FILM COATED ORAL at 22:14

## 2017-01-01 RX ADMIN — ALBUTEROL 2.5 MILLIGRAM(S): 90 AEROSOL, METERED ORAL at 23:58

## 2017-01-01 RX ADMIN — ALBUTEROL 2.5 MILLIGRAM(S): 90 AEROSOL, METERED ORAL at 05:59

## 2017-01-01 RX ADMIN — MORPHINE SULFATE 2 MILLIGRAM(S): 50 CAPSULE, EXTENDED RELEASE ORAL at 09:37

## 2017-01-01 RX ADMIN — SIMVASTATIN 20 MILLIGRAM(S): 20 TABLET, FILM COATED ORAL at 21:45

## 2017-01-01 RX ADMIN — AMIODARONE HYDROCHLORIDE 33.33 MG/MIN: 400 TABLET ORAL at 22:43

## 2017-01-01 RX ADMIN — LISINOPRIL 10 MILLIGRAM(S): 2.5 TABLET ORAL at 05:34

## 2017-01-01 RX ADMIN — Medication 0.5 MILLIGRAM(S): at 11:33

## 2017-01-01 RX ADMIN — MORPHINE SULFATE 2 MILLIGRAM(S): 50 CAPSULE, EXTENDED RELEASE ORAL at 05:18

## 2017-01-01 RX ADMIN — PIPERACILLIN AND TAZOBACTAM 25 GRAM(S): 4; .5 INJECTION, POWDER, LYOPHILIZED, FOR SOLUTION INTRAVENOUS at 05:40

## 2017-01-01 RX ADMIN — Medication 81 MILLIGRAM(S): at 13:06

## 2017-01-01 RX ADMIN — MORPHINE SULFATE 2 MILLIGRAM(S): 50 CAPSULE, EXTENDED RELEASE ORAL at 13:27

## 2017-01-01 RX ADMIN — MORPHINE SULFATE 2 MILLIGRAM(S): 50 CAPSULE, EXTENDED RELEASE ORAL at 06:20

## 2017-01-01 RX ADMIN — AMIODARONE HYDROCHLORIDE 200 MILLIGRAM(S): 400 TABLET ORAL at 06:00

## 2017-01-01 RX ADMIN — RISPERIDONE 1 MILLIGRAM(S): 4 TABLET ORAL at 17:16

## 2017-01-01 RX ADMIN — PIPERACILLIN AND TAZOBACTAM 25 GRAM(S): 4; .5 INJECTION, POWDER, LYOPHILIZED, FOR SOLUTION INTRAVENOUS at 05:54

## 2017-01-01 RX ADMIN — SERTRALINE 100 MILLIGRAM(S): 25 TABLET, FILM COATED ORAL at 11:41

## 2017-01-01 RX ADMIN — MORPHINE SULFATE 2 MILLIGRAM(S): 50 CAPSULE, EXTENDED RELEASE ORAL at 09:20

## 2017-01-01 RX ADMIN — Medication 1 TABLET(S): at 11:50

## 2017-01-01 RX ADMIN — HEPARIN SODIUM 5000 UNIT(S): 5000 INJECTION INTRAVENOUS; SUBCUTANEOUS at 05:40

## 2017-01-01 RX ADMIN — Medication 81 MILLIGRAM(S): at 15:54

## 2017-01-01 RX ADMIN — Medication 0.25 MILLIGRAM(S): at 14:48

## 2017-01-01 RX ADMIN — Medication 0.25 MILLIGRAM(S): at 11:27

## 2017-01-01 RX ADMIN — FINASTERIDE 5 MILLIGRAM(S): 5 TABLET, FILM COATED ORAL at 11:13

## 2017-01-01 RX ADMIN — MEROPENEM 200 MILLIGRAM(S): 1 INJECTION INTRAVENOUS at 13:42

## 2017-01-01 RX ADMIN — MORPHINE SULFATE 2 MILLIGRAM(S): 50 CAPSULE, EXTENDED RELEASE ORAL at 23:44

## 2017-01-01 RX ADMIN — MORPHINE SULFATE 2 MILLIGRAM(S): 50 CAPSULE, EXTENDED RELEASE ORAL at 18:20

## 2017-01-01 RX ADMIN — MORPHINE SULFATE 2 MILLIGRAM(S): 50 CAPSULE, EXTENDED RELEASE ORAL at 06:36

## 2017-01-01 RX ADMIN — Medication 650 MILLIGRAM(S): at 17:30

## 2017-01-01 RX ADMIN — MORPHINE SULFATE 2 MILLIGRAM(S): 50 CAPSULE, EXTENDED RELEASE ORAL at 17:55

## 2017-01-01 RX ADMIN — Medication 650 MILLIGRAM(S): at 17:13

## 2017-01-01 RX ADMIN — MORPHINE SULFATE 2 MILLIGRAM(S): 50 CAPSULE, EXTENDED RELEASE ORAL at 20:30

## 2017-01-01 RX ADMIN — MORPHINE SULFATE 2 MILLIGRAM(S): 50 CAPSULE, EXTENDED RELEASE ORAL at 01:10

## 2017-01-01 RX ADMIN — CARVEDILOL PHOSPHATE 12.5 MILLIGRAM(S): 80 CAPSULE, EXTENDED RELEASE ORAL at 17:22

## 2017-01-01 RX ADMIN — TAMSULOSIN HYDROCHLORIDE 0.4 MILLIGRAM(S): 0.4 CAPSULE ORAL at 01:15

## 2017-01-01 RX ADMIN — MEROPENEM 200 MILLIGRAM(S): 1 INJECTION INTRAVENOUS at 13:27

## 2017-01-01 RX ADMIN — ENOXAPARIN SODIUM 60 MILLIGRAM(S): 100 INJECTION SUBCUTANEOUS at 05:44

## 2017-01-01 RX ADMIN — Medication 150 MILLIGRAM(S): at 17:13

## 2017-01-01 RX ADMIN — ALBUTEROL 2.5 MILLIGRAM(S): 90 AEROSOL, METERED ORAL at 11:53

## 2017-01-01 RX ADMIN — Medication 250 MILLIGRAM(S): at 17:29

## 2017-01-01 RX ADMIN — CLOPIDOGREL BISULFATE 75 MILLIGRAM(S): 75 TABLET, FILM COATED ORAL at 11:13

## 2017-01-01 RX ADMIN — PIPERACILLIN AND TAZOBACTAM 25 GRAM(S): 4; .5 INJECTION, POWDER, LYOPHILIZED, FOR SOLUTION INTRAVENOUS at 21:58

## 2017-01-01 RX ADMIN — ALBUTEROL 2.5 MILLIGRAM(S): 90 AEROSOL, METERED ORAL at 06:01

## 2017-01-01 RX ADMIN — Medication 0.25 MILLIGRAM(S): at 05:43

## 2017-01-01 RX ADMIN — ALBUTEROL 2.5 MILLIGRAM(S): 90 AEROSOL, METERED ORAL at 00:11

## 2017-01-01 RX ADMIN — Medication 1 TABLET(S): at 11:14

## 2017-01-01 RX ADMIN — Medication 0.5 MILLIGRAM(S): at 12:12

## 2017-01-01 RX ADMIN — SIMVASTATIN 20 MILLIGRAM(S): 20 TABLET, FILM COATED ORAL at 21:48

## 2017-01-01 RX ADMIN — Medication 650 MILLIGRAM(S): at 01:16

## 2017-01-01 RX ADMIN — PIPERACILLIN AND TAZOBACTAM 25 GRAM(S): 4; .5 INJECTION, POWDER, LYOPHILIZED, FOR SOLUTION INTRAVENOUS at 21:30

## 2017-01-01 RX ADMIN — PIPERACILLIN AND TAZOBACTAM 25 GRAM(S): 4; .5 INJECTION, POWDER, LYOPHILIZED, FOR SOLUTION INTRAVENOUS at 14:13

## 2017-01-01 RX ADMIN — LACTULOSE 10 GRAM(S): 10 SOLUTION ORAL at 11:50

## 2017-01-01 RX ADMIN — ALBUTEROL 2.5 MILLIGRAM(S): 90 AEROSOL, METERED ORAL at 23:55

## 2017-01-01 RX ADMIN — Medication 1 TABLET(S): at 11:18

## 2017-01-01 RX ADMIN — Medication 650 MILLIGRAM(S): at 21:33

## 2017-01-01 RX ADMIN — ROBINUL 0.2 MILLIGRAM(S): 0.2 INJECTION INTRAMUSCULAR; INTRAVENOUS at 21:07

## 2017-01-01 RX ADMIN — RISPERIDONE 1 MILLIGRAM(S): 4 TABLET ORAL at 05:54

## 2017-01-01 RX ADMIN — SIMVASTATIN 20 MILLIGRAM(S): 20 TABLET, FILM COATED ORAL at 21:52

## 2017-01-01 RX ADMIN — MORPHINE SULFATE 2 MILLIGRAM(S): 50 CAPSULE, EXTENDED RELEASE ORAL at 09:44

## 2017-01-01 RX ADMIN — AMIODARONE HYDROCHLORIDE 200 MILLIGRAM(S): 400 TABLET ORAL at 05:45

## 2017-01-01 RX ADMIN — MEROPENEM 200 MILLIGRAM(S): 1 INJECTION INTRAVENOUS at 11:07

## 2017-01-01 RX ADMIN — ALBUTEROL 2.5 MILLIGRAM(S): 90 AEROSOL, METERED ORAL at 11:31

## 2017-01-01 RX ADMIN — Medication 1 TABLET(S): at 11:40

## 2017-01-01 RX ADMIN — RISPERIDONE 1 MILLIGRAM(S): 4 TABLET ORAL at 05:34

## 2017-01-01 RX ADMIN — MEROPENEM 200 MILLIGRAM(S): 1 INJECTION INTRAVENOUS at 05:56

## 2017-01-01 RX ADMIN — AMIODARONE HYDROCHLORIDE 16.67 MG/MIN: 400 TABLET ORAL at 04:47

## 2017-01-01 RX ADMIN — MORPHINE SULFATE 2 MILLIGRAM(S): 50 CAPSULE, EXTENDED RELEASE ORAL at 06:25

## 2017-01-01 RX ADMIN — HEPARIN SODIUM 5000 UNIT(S): 5000 INJECTION INTRAVENOUS; SUBCUTANEOUS at 17:09

## 2017-01-01 RX ADMIN — Medication 0.5 MILLIGRAM(S): at 11:47

## 2017-01-01 RX ADMIN — MORPHINE SULFATE 2 MILLIGRAM(S): 50 CAPSULE, EXTENDED RELEASE ORAL at 19:55

## 2017-01-01 RX ADMIN — MORPHINE SULFATE 2 MILLIGRAM(S): 50 CAPSULE, EXTENDED RELEASE ORAL at 22:59

## 2017-01-01 RX ADMIN — ALBUTEROL 2.5 MILLIGRAM(S): 90 AEROSOL, METERED ORAL at 05:50

## 2017-01-01 RX ADMIN — ROBINUL 0.2 MILLIGRAM(S): 0.2 INJECTION INTRAMUSCULAR; INTRAVENOUS at 06:01

## 2017-01-01 RX ADMIN — ROBINUL 0.2 MILLIGRAM(S): 0.2 INJECTION INTRAMUSCULAR; INTRAVENOUS at 00:57

## 2017-01-01 RX ADMIN — MORPHINE SULFATE 2 MILLIGRAM(S): 50 CAPSULE, EXTENDED RELEASE ORAL at 17:30

## 2017-01-01 RX ADMIN — Medication 650 MILLIGRAM(S): at 13:11

## 2017-01-01 RX ADMIN — ALBUTEROL 2.5 MILLIGRAM(S): 90 AEROSOL, METERED ORAL at 23:24

## 2017-01-01 RX ADMIN — FINASTERIDE 5 MILLIGRAM(S): 5 TABLET, FILM COATED ORAL at 11:41

## 2017-01-01 RX ADMIN — Medication 650 MILLIGRAM(S): at 05:02

## 2017-01-01 RX ADMIN — MEROPENEM 200 MILLIGRAM(S): 1 INJECTION INTRAVENOUS at 05:30

## 2017-01-01 RX ADMIN — MORPHINE SULFATE 2 MILLIGRAM(S): 50 CAPSULE, EXTENDED RELEASE ORAL at 22:28

## 2017-04-08 NOTE — ED PROVIDER NOTE - OBJECTIVE STATEMENT
Pertinent PMH/PSH/FHx/SHx and Review of Systems contained within:  84 yo m with PMH of BPH with permanent indwelling catheter presents in ED c/o no urine output from strickland since last night. No aggravating or relieving factors, No fever/chills, No photophobia/eye pain/changes in vision, No ear pain/sore throat/dysphagia, No chest pain/palpitations, no SOB/cough/wheeze/stridor, No abdominal pain, No N/V/D, no dysuria/frequency/discharge, No neck/back pain, no rash, no changes in neurological status/function.

## 2017-06-09 NOTE — ED PROVIDER NOTE - CRITICAL CARE PROVIDED
documentation/direct patient care (not related to procedure)/consult w/ pt's family directly relating to pts condition/interpretation of diagnostic studies

## 2017-06-09 NOTE — ED ADULT NURSE NOTE - OBJECTIVE STATEMENT
BIB with son, son reports abdominal pain and palpations, starting yesterday afternoon, worsens with movement, and palpation of generalized abdomen. Son states decreased urine flow from urinary catheter which was present on arrival. Son reports no nausea, vomiting, diarrhea.

## 2017-06-09 NOTE — ED ADULT TRIAGE NOTE - CHIEF COMPLAINT QUOTE
Pt had chronic strickland replaced 2 days ago at urologist office. Little urinary output noted by personal aide & pts son.

## 2017-06-09 NOTE — ED PROVIDER NOTE - CARE PLAN
Principal Discharge DX:	Sepsis, due to unspecified organism  Secondary Diagnosis:	Urinary tract infection without hematuria, site unspecified  Secondary Diagnosis:	Pulmonary congestion

## 2017-06-09 NOTE — ED PROVIDER NOTE - OBJECTIVE STATEMENT
Pertinent PMH/PSH/FHx/SHx and Review of Systems contained within:  Patient with history of HTN, HLD, advanced dementia, and BPH with indwelling strickland cath presents to the ED for generalized weakness and abdominal pain.  Patient was complaining to his sons about wanting to have a bowel movement, but no diarrhea or melena noted.  Son noted that he was breathing differently, but has not been coughing. Pertinent PMH/PSH/FHx/SHx and Review of Systems contained within:  Patient with history of HTN, HLD, CVA advanced dementia, and indwelling strickland cath (since CVA) presents to the ED for generalized weakness and abdominal pain.  Patient was complaining to his sons about wanting to have a bowel movement, but no diarrhea or melena noted.  Son noted that he was breathing differently yesterday, but has not been coughing.

## 2017-06-09 NOTE — ED PROVIDER NOTE - MEDICAL DECISION MAKING DETAILS
Patient presents with weakness, abdominal discomfort. Patient presents with weakness, abdominal discomfort.  Sepsis workup pursued, broad spectrum abx given.  Patient showing evidence of CHF with ischemic demand.  Will give fluids tentatively.  CT abdomen pending prior to patient's admission to hospital.  Patient signed out to incoming physician pending CT. Patient presents with weakness, abdominal discomfort.  Sepsis workup pursued, broad spectrum abx given.  Patient showing evidence of CHF with ischemic demand.  Will give fluids tentatively.  CT abdomen pending prior to patient's admission to hospital.  Also requesting ICU consult since patient is quite ill appearing with persistent sinus tachycardia despite interventions.  Patient signed out to incoming physician pending CT and critical care consult. Patient presents with weakness, abdominal discomfort.  Sepsis workup pursued, broad spectrum abx given.  Patient showing evidence of CHF with ischemic demand.  Will give fluids tentatively.  CT abdomen pending prior to patient's admission to hospital.  Also requesting ICU consult since patient is quite ill appearing with persistent sinus tachycardia despite interventions.  Patient signed out to incoming physician pending CT and critical care consult.    Pt with possible PNA and UTI otherwise to admit for further care to Telemetry as ICU evaluated pt, states cleared from ICU. Patient presents with weakness, abdominal discomfort.  Sepsis workup pursued, broad spectrum abx given.  Patient showing evidence of CHF with ischemic demand.  Will give fluids tentatively.  CT abdomen pending prior to patient's admission to hospital.  Also requesting ICU consult since patient is quite ill appearing with persistent sinus tachycardia despite interventions.  Patient signed out to incoming physician pending CT and critical care consult.    Pt with possible PNA and UTI otherwise to admit for further care to Telemetry as ICU evaluated pt, states cleared from ICU will admit to Dr. Doss.

## 2017-06-10 NOTE — H&P ADULT - PROBLEM SELECTOR PLAN 2
urine culture: greater than 100,000 cfu e. coli  blood culture: no growth to date  continue antibiotics

## 2017-06-10 NOTE — H&P ADULT - NSHPPHYSICALEXAM_GEN_ALL_CORE
CNS alert awake  Heart S1 S2  Lungs: clear without wheezing rales rhonchi  Abdomen: bowel sounds x 4 nontender  Extremities: bilateral heel erythema, no ulceration  Genitourinary: +indwelling strickland catheter present on admission with sedimentation noted

## 2017-06-10 NOTE — H&P ADULT - HISTORY OF PRESENT ILLNESS
? Chief Complaint: The patient is a 84y Male complaining of urinary catheter complications.  ? HPI Objective Statement: Pertinent PMH/PSH/FHx/SHx and Review of Systems contained within:  Patient with history of HTN, HLD, CVA advanced dementia, and indwelling strickland cath (since CVA) presents to the ED for generalized weakness and abdominal pain.  Patient was complaining to his sons about wanting to have a bowel movement, but no diarrhea or melena noted.  Son noted that he was breathing differently yesterday, but has not been coughing.

## 2017-06-10 NOTE — H&P ADULT - NSHPLABSRESULTS_GEN_ALL_CORE
bilateral pleural effusions  urine culture: greater than 100,000 cfu e. coli  blood culture: no growth to date  troponin elevated

## 2017-06-11 NOTE — PROGRESS NOTE ADULT - SUBJECTIVE AND OBJECTIVE BOX
Chief Complaints:  Patient is a 84y old  Male who presents with a chief complaint of fever sepsis found to have elevated enzymes.    Interval History: Cardiology follow up noted. No sob    ROS: Unchanged    Physical Exam:    In NAD:    Vital Signs Last 24 Hrs  T(C): 36.1, Max: 37 (06-11 @ 05:16)  T(F): 97, Max: 98.6 (06-11 @ 05:16)  HR: 101 (97 - 120)  BP: 104/62 (95/60 - 121/63)  BP(mean): --  RR: 18 (18 - 18)  SpO2: 96% (95% - 96%)    Eyes: Anicteric.  Neck: Supple No jugular venous distention  Chest: Good air entry bilaterally  CVS: S1 and S2 regular  Abdomen: Soft BS + in all quadrant, no tenderness, no rebound tenderness and guarding. Folwy in place drainin clear urine  Extremity: No edema cyanosis or clubbing.  CNS: awake and alert.    MEDICATIONS  (STANDING):  heparin  Injectable 5000Unit(s) SubCutaneous every 12 hours  aspirin enteric coated 81milliGRAM(s) Oral daily  finasteride 5milliGRAM(s) Oral daily  tamsulosin 0.4milliGRAM(s) Oral at bedtime  simvastatin 20milliGRAM(s) Oral at bedtime  multivitamin 1Tablet(s) Oral daily  pantoprazole    Tablet 40milliGRAM(s) Oral before breakfast  piperacillin/tazobactam IVPB. 3.375Gram(s) IV Intermittent every 8 hours  sertraline 100milliGRAM(s) Oral daily  ALPRAZolam 0.25milliGRAM(s) Oral two times a day  risperiDONE   Tablet 1milliGRAM(s) Oral two times a day  lactulose Syrup 10Gram(s) Oral daily  lisinopril 10milliGRAM(s) Oral daily  clopidogrel Tablet 75milliGRAM(s) Oral daily    11 Jun 2017 06:54    143    |  110    |  24     ----------------------------<  106    3.7     |  24     |  1.00     Ca    8.3        11 Jun 2017 06:54  Phos  3.0       11 Jun 2017 06:54  Mg     2.1       11 Jun 2017 06:54                            11.4   9.1   )-----------( 219      ( 11 Jun 2017 06:54 )             33.8     CAPILLARY BLOOD GLUCOSE    CARDIAC MARKERS ( 10 Khanh 2017 06:54 )  .526 ng/mL / x     / x     / x     / x      CARDIAC MARKERS ( 10 Khanh 2017 01:25 )  .572 ng/mL / x     / x     / x     / x

## 2017-06-11 NOTE — PROGRESS NOTE ADULT - SUBJECTIVE AND OBJECTIVE BOX
Patient is a 84y old  Male who presents with a chief complaint of       PAST MEDICAL & SURGICAL HISTORY:  BPH (benign prostatic hyperplasia)  Hypercholesteremia  Hypertension  No significant past surgical history      Summary of admission HPI: NSTEMI                PREVIOUS DIAGNOSTIC TESTING:      ECHO  FINDINGS:    STRESS  FINDINGS:    CATHETERIZATION  FINDINGS:    ELECTROPHYSIOLOGY STUDY  FINDINGS:    CAROTID ULTRASOUND:  FINDINGS    VENOUS DUPLEX SCAN:  FINDINGS:    CHEST CT PULMONARY ANGIO with IV Contrast:  FINDINGS:  MEDICATIONS  (STANDING):  heparin  Injectable 5000Unit(s) SubCutaneous every 12 hours  aspirin enteric coated 81milliGRAM(s) Oral daily  finasteride 5milliGRAM(s) Oral daily  tamsulosin 0.4milliGRAM(s) Oral at bedtime  simvastatin 20milliGRAM(s) Oral at bedtime  multivitamin 1Tablet(s) Oral daily  pantoprazole    Tablet 40milliGRAM(s) Oral before breakfast  piperacillin/tazobactam IVPB. 3.375Gram(s) IV Intermittent every 8 hours  sertraline 100milliGRAM(s) Oral daily  ALPRAZolam 0.25milliGRAM(s) Oral two times a day  risperiDONE   Tablet 1milliGRAM(s) Oral two times a day  lactulose Syrup 10Gram(s) Oral daily  lisinopril 10milliGRAM(s) Oral daily  clopidogrel Tablet 75milliGRAM(s) Oral daily    MEDICATIONS  (PRN):  acetaminophen   Tablet. 650milliGRAM(s) Oral every 6 hours PRN Mild Pain (1 - 3)      FAMILY HISTORY:  No pertinent family history in first degree relatives      SOCIAL HISTORY:    CIGARETTES:    ALCOHOL:    REVIEW OF SYSTEMS:    CONSTITUTIONAL: No fever, weight loss, chills, shakes, or fatigue  EYES: No eye pain, visual disturbances, or discharge  ENMT:  No difficulty hearing, tinnitus, vertigo; No sinus or throat pain  NECK: No pain or stiffness  BREASTS: No pain, masses, or nipple discharge  RESPIRATORY: No cough, wheezing, hemoptysis, or shortness of breath  CARDIOVASCULAR: No chest pain, dyspnea, palpitations, dizziness, syncope, paroxysmal nocturnal dyspnea, orthopnea, or arm or leg swelling  GASTROINTESTINAL: No abdominal  or epigastric pain, nausea, vomiting, hematemesis, diarrhea, constipation, melena or bright red blood.  GENITOURINARY: No dysuria, nocturia, hematuria, or urinary incontinence  NEUROLOGICAL: No headaches, memory loss, slurred speech, limb weakness, loss of strength, numbness, or tremors  SKIN: No itching, burning, rashes, or lesions   LYMPH NODES: No enlarged glands  ENDOCRINE: No heat or cold intolerance, or hair loss  MUSCULOSKELETAL: No joint pain or swelling, muscle, back, or extremity pain  PSYCHIATRIC: No depression, anxiety, or difficulty sleeping  HEME/LYMPH: No easy bruising or bleeding gums  ALLERY AND IMMUNOLOGIC: No hives or rash.      Vital Signs Last 24 Hrs  T(C): 36.1, Max: 37 (06-11 @ 05:16)  T(F): 97, Max: 98.6 (06-11 @ 05:16)  HR: 101 (97 - 120)  BP: 104/62 (95/60 - 121/63)  BP(mean): --  RR: 18 (18 - 18)  SpO2: 96% (95% - 96%)    PHYSICAL EXAM:    GENERAL: In no apparent distress, well nourished, and hydrated.  HEAD:  Atraumatic, Normocephalic  EYES: EOMI, PERRLA, conjunctiva and sclera clear  ENMT: No tonsillar erythema, exudates, or enlargement; Moist mucous membranes, Good dentition, No lesions  NECK: Supple and normal thyroid.  No JVD or carotid bruit.  Carotid pulse is 2+ bilaterally.  HEART: Regular rate and rhythm; No murmurs, rubs, or gallops.  PULMONARY: Clear to auscultation and perfusion.  No rales, wheezing, or rhonchi bilaterally.  ABDOMEN: Soft, Nontender, Nondistended; Bowel sounds present  EXTREMITIES:  2+ Peripheral Pulses, No clubbing, cyanosis, or edema  LYMPH: No lymphadenopathy noted  NEUROLOGICAL: Grossly nonfocal          INTERPRETATION OF TELEMETRY:    ECG:    I&O's Detail  I & Os for 24h ending 11 Jun 2017 07:00  =============================================  IN:    Oral Fluid: 620 ml    Solution: 300 ml    Total IN: 920 ml  ---------------------------------------------  OUT:    Indwelling Catheter - Urethral: 1100 ml    Total OUT: 1100 ml  ---------------------------------------------  Total NET: -180 ml    I & Os for current day (as of 11 Jun 2017 18:39)  =============================================  IN:    Solution: 100 ml    Total IN: 100 ml  ---------------------------------------------  OUT:    Total OUT: 0 ml  ---------------------------------------------  Total NET: 100 ml      LABS:                        11.4   9.1   )-----------( 219      ( 11 Jun 2017 06:54 )             33.8     06-11    143  |  110<H>  |  24<H>  ----------------------------<  106<H>  3.7   |  24  |  1.00    Ca    8.3<L>      11 Jun 2017 06:54  Phos  3.0     06-11  Mg     2.1     06-11      CARDIAC MARKERS ( 10 Khanh 2017 06:54 )  .526 ng/mL / x     / x     / x     / x      CARDIAC MARKERS ( 10 Khanh 2017 01:25 )  .572 ng/mL / x     / x     / x     / x              BNP  I&O's Detail  I & Os for 24h ending 11 Jun 2017 07:00  =============================================  IN:    Oral Fluid: 620 ml    Solution: 300 ml    Total IN: 920 ml  ---------------------------------------------  OUT:    Indwelling Catheter - Urethral: 1100 ml    Total OUT: 1100 ml  ---------------------------------------------  Total NET: -180 ml    I & Os for current day (as of 11 Jun 2017 18:39)  =============================================  IN:    Solution: 100 ml    Total IN: 100 ml  ---------------------------------------------  OUT:    Total OUT: 0 ml  ---------------------------------------------  Total NET: 100 ml    Daily     Daily     RADIOLOGY & ADDITIONAL STUDIES:

## 2017-06-12 NOTE — DIETITIAN INITIAL EVALUATION ADULT. - PERTINENT LABORATORY DATA
06-11 Na143 mmol/L Glu 106 mg/dL<H> K+ 3.7 mmol/L Cr  1.00 mg/dL BUN 24 mg/dL<H> Est GFR 69 mL/min/1.73M2 Phos 3.0 mg/dL Ca 8.3 mg/dL<L> Hgb 11.4 g/dL<L> Hct 33.8 %<L> 06-09 Alb 3.5 g/dL serum pro-brain natriuretic peptide 98842 pg/dL<H> Glucose 164 mg/dL<H>

## 2017-06-12 NOTE — DIETITIAN INITIAL EVALUATION ADULT. - NUTRITION INTERVENTION
Vitamin/Medical Food Supplements/Collaboration and Referral of Nutrition Care/Meals and Snack/Nutrition Education

## 2017-06-12 NOTE — DIETITIAN INITIAL EVALUATION ADULT. - ENERGY NEEDS
Height (cm): 170.2 (06-09)  Weight (kg): 68.8 (06-09)  BMI (kg/m2): 23.8 (06-09)  IBW: 67.1 kg    % IBW: 90%   UBW: 67.1 kg   %UBW: 90%, ? adm wt. 68.8 kg vs current wt. 61 kg

## 2017-06-12 NOTE — DIETITIAN INITIAL EVALUATION ADULT. - ETIOLOGY
inadequate protein-energy intake in setting of altered mental status, altered chew/ swallow, cardiac dysfunction

## 2017-06-12 NOTE — DIETITIAN INITIAL EVALUATION ADULT. - NS AS NUTRI INTERV COLLABORAT
Recommend swallow evaluation to assess appropriate consistency of diet & fluids/Collaboration with other providers

## 2017-06-12 NOTE — PROGRESS NOTE ADULT - SUBJECTIVE AND OBJECTIVE BOX
Patient is a 84y old  Male who presents with a chief complaint of     INTERVAL HPI/OVERNIGHT EVENTS:  episode of non sustained v tach  rx for uti.strickland in place    MEDICATIONS  (STANDING):  heparin  Injectable 5000Unit(s) SubCutaneous every 12 hours  aspirin enteric coated 81milliGRAM(s) Oral daily  finasteride 5milliGRAM(s) Oral daily  tamsulosin 0.4milliGRAM(s) Oral at bedtime  simvastatin 20milliGRAM(s) Oral at bedtime  multivitamin 1Tablet(s) Oral daily  pantoprazole    Tablet 40milliGRAM(s) Oral before breakfast  piperacillin/tazobactam IVPB. 3.375Gram(s) IV Intermittent every 8 hours  sertraline 100milliGRAM(s) Oral daily  ALPRAZolam 0.25milliGRAM(s) Oral two times a day  risperiDONE   Tablet 1milliGRAM(s) Oral two times a day  lactulose Syrup 10Gram(s) Oral daily  clopidogrel Tablet 75milliGRAM(s) Oral daily  metoprolol 12.5milliGRAM(s) Oral two times a day  amiodarone Infusion 1mG/Min IV Continuous <Continuous>  amiodarone Infusion 0.5mG/Min IV Continuous <Continuous>    MEDICATIONS  (PRN):  acetaminophen   Tablet. 650milliGRAM(s) Oral every 6 hours PRN Mild Pain (1 - 3)        REVIEW OF SYSTEMS:  CONSTITUTIONAL: No fever, weight loss, or fatigue  EYES: No eye pain, visual disturbances, or discharge  ENMT:  No difficulty hearing, tinnitus, vertigo; No sinus or throat pain  NECK: No pain or stiffness  RESPIRATORY: No cough, wheezing, chills or hemoptysis; No shortness of breath  CARDIOVASCULAR: No chest pain, palpitations, dizziness, or leg swelling  GASTROINTESTINAL: No abdominal or epigastric pain. No nausea, vomiting, or hematemesis; No diarrhea or constipation. No melena or hematochezia.  GENITOURINARY: No dysuria, frequency, hematuria, or incontinence  NEUROLOGICAL: No headaches, memory loss, loss of strength, numbness, or tremors  SKIN: No itching, burning, rashes, or lesions      Vital Signs Last 24 Hrs  T(C): 37.4, Max: 37.8 (06-12 @ 08:14)  T(F): 99.4, Max: 100 (06-12 @ 08:14)  HR: 122 (113 - 133)  BP: 128/75 (93/56 - 128/75)  BP(mean): --  RR: 20 (17 - 20)  SpO2: 95% (90% - 97%)    PHYSICAL EXAM:  GENERAL: NAD, well-groomed, well-developed  HEAD:  Atraumatic, Normocephalic  EYES: EOMI, PERRLA, conjunctiva and sclera clear  ENMT: No tonsillar erythema, exudates, or enlargement; Moist mucous membranes   NECK: Supple, No JVD   NERVOUS SYSTEM:  Alert & Oriented X3, Good concentration; Motor Strength 5/5 B/L upper and lower extremities; DTRs 2+ intact and symmetric  CHEST/LUNG: Clear to percussion bilaterally; No rales, rhonchi, wheezing, or rubs  HEART: Regular rate and rhythm; No murmurs, rubs, or gallops  ABDOMEN: Soft, Nontender, Nondistended; Bowel sounds present  EXTREMITIES:  2+ Peripheral Pulses, No clubbing, cyanosis, or edema  LYMPH: No lymphadenopathy noted  SKIN: No rashes or lesions    LABS:                        11.6   8.7   )-----------( 213      ( 12 Jun 2017 17:37 )             33.6     06-12    143  |  109<H>  |  23  ----------------------------<  124<H>  3.8   |  23  |  0.99    Ca    8.5      12 Jun 2017 17:37  Phos  3.0     06-12  Mg     2.3     06-12          CAPILLARY BLOOD GLUCOSE      RADIOLOGY & ADDITIONAL TESTS:    Imaging Personally Reviewed:  [ ] YES  [ ] NO    Consultant(s) Notes Reviewed:  [ ] YES  [ ] NO    Care Discussed with Consultants/Other Providers [ ] YES  [ ] NO

## 2017-06-12 NOTE — CHART NOTE - NSCHARTNOTEFT_GEN_A_CORE
Upon Nutritional Assessment by the Registered Dietitian your patient was determined to meet criteria / has evidence of the following diagnosis/diagnoses:          [ ]  Mild Protein Calorie Malnutrition        [ ]  Moderate Protein Calorie Malnutrition        [ X] Severe Protein Calorie Malnutrition        [ ] Unspecified Protein Calorie Malnutrition        [ ] Underweight / BMI <19        [ ] Morbid Obesity / BMI > 40      Findings as based on:  •  Comprehensive nutrition assessment and consultation  •  Calorie counts (nutrient intake analysis)  •  Food acceptance and intake status from observations by staff  •  Follow up  •  Patient education  •  Intervention secondary to interdisciplinary rounds  •   concerns      Treatment:    The following diet has been recommended:  Low sodium , Dysphagia 1 Pureed- Thin Liquids , Ensure Pudding 4 oz 3x/day (510 olvin, 12 gm pro)   Swallow evaluation to assess appropriate consistency of diet & fluids   multivitamin c minerals & D/C multivitamin       PROVIDER Section:     By signing this assessment you are acknowledging and agree with the diagnosis/diagnoses assigned by the Registered Dietitian    Comments:

## 2017-06-12 NOTE — DIETITIAN INITIAL EVALUATION ADULT. - ADHERENCE
chopped foods c breads, croissants etc., pt lived sons, family did the shopping & had HHA did the cooking/good

## 2017-06-12 NOTE — DIETITIAN INITIAL EVALUATION ADULT. - PHYSICAL APPEARANCE
debilitated/underweight/other (specify)/BMI=21.0 for current wt. of 61 kg, Nutrition focused physical exam conducted; found physical signs of malnutrition [ ]absent [ X]present; Subcutaneous fat Exam;  [severe  ]  Orbital fat pads region,  [ unable  ]Buccal fat region,  [ moderate ]triceps region, [moderate  ]ribs region.  Muscle Exam; [severe  ]temples region, [severe  ]clavicle region, [ severe ]shoulder region, [ unable ]Scapula region, [ unable  ]Interosseous region, [moderate  ]thigh region, [ uanble ]Calf region, pt unable to follow directions, falling asleep during exam

## 2017-06-12 NOTE — DIETITIAN INITIAL EVALUATION ADULT. - PERTINENT MEDS FT
ABX , metoprolol , lactulose syrup , risperidone, sertraline, , multivitamin , pantoprazole , simvastatin

## 2017-06-12 NOTE — DIETITIAN INITIAL EVALUATION ADULT. - OTHER INFO
Pt seen due to PA consult for assessment.  Pt reports that pt dislikes pureed diet & wanted to know if diet can be upgraded to chopped meats & breads.  As per daughter pt prefers to have chocolate flavored supplements.  Pt does have some coughing c eating.  Daughter reports pt c wt. loss, however unable to specify amount of loss.  As per current wt., pt c wt. loss of 13.6Lbs/ 6.2 kg in 6 months. Pt seen due to PA consult for assessment.  Pt reports that pt dislikes pureed diet & wanted to know if diet can be upgraded to chopped meats & breads.  As per daughter pt prefers to have chocolate flavored supplements.  Pt does have some coughing c eating.  Daughter reports pt c wt. loss, however unable to specify amount of loss.  As per current wt., pt c wt. loss of 13.6Lbs/ 6.2 kg in 6 months.  Pt adm c sepsis, problems include MI, UTI, pulmonary congestion, BPH.

## 2017-06-12 NOTE — DIETITIAN INITIAL EVALUATION ADULT. - NS AS NUTRI INTERV MEALS SNACK
Texture-modified diet/Mineral - modified diet/Recommend Dysphagia 1 Pureed- Thin Liquids , Low sodium diet

## 2017-06-13 NOTE — GOALS OF CARE CONVERSATION - PERSONAL ADVANCE DIRECTIVE - CONVERSATION DETAILS
Spoke with dtr Christen who informed me she had just left hospital.  She informed me that her mother pts wife  3 weeks ago.  She wants to speak with  because when pt is medically stable he will go home but needs the [facility] hrs to increase to 24 hrs to be with pt at home.  I informed dtr I will be in AM 17 & I will meet with her then & discuss GOC & Advance Care Planning with MIKAYLA at that time. I will also let social work know of her needs.

## 2017-06-13 NOTE — CHART NOTE - NSCHARTNOTEFT_GEN_A_CORE
[facility]    Notified by RN to evaluate patient for elevated heart rate in the 140s. Discussed with Dr. Magaña at 2200 on June 12 that patient has had runs of vtach and afib who advised to put patient on an Amiodarone drip 1mg/min with out a bolus. Patient seen and examined at bedside. Denies CP, palpitation, sweets. Will continue to monitor.    Now at 0430 June 13, Patients HR is in the 150s. Left message for Dr. Magaña. Spoke with Dr. Doss, and advised to administer .25 mg of digoxin. Will continue to monitor patient.

## 2017-06-13 NOTE — PROGRESS NOTE ADULT - SUBJECTIVE AND OBJECTIVE BOX
HPI:  ? Chief Complaint: The patient is a 84y Male complaining of urinary catheter complications.  ? HPI Objective Statement: Pertinent PMH/PSH/FHx/SHx and Review of Systems contained within:  Patient with history of HTN, HLD, CVA advanced dementia, and indwelling strickland cath (since CVA) presents to the ED for generalized weakness and abdominal pain.  Patient was complaining to his sons about wanting to have a bowel movement, but no diarrhea or melena noted.  Son noted that he was breathing differently yesterday, but has not been coughing. (10 Khanh 2017 14:55)  MEDICATIONS  (STANDING):  aspirin enteric coated 81milliGRAM(s) Oral daily  finasteride 5milliGRAM(s) Oral daily  tamsulosin 0.4milliGRAM(s) Oral at bedtime  simvastatin 20milliGRAM(s) Oral at bedtime  multivitamin 1Tablet(s) Oral daily  pantoprazole    Tablet 40milliGRAM(s) Oral before breakfast  piperacillin/tazobactam IVPB. 3.375Gram(s) IV Intermittent every 8 hours  sertraline 100milliGRAM(s) Oral daily  ALPRAZolam 0.25milliGRAM(s) Oral two times a day  risperiDONE   Tablet 1milliGRAM(s) Oral two times a day  lactulose Syrup 10Gram(s) Oral daily  metoprolol 12.5milliGRAM(s) Oral two times a day  amiodarone Infusion 1mG/Min IV Continuous <Continuous>  amiodarone Infusion 0.5mG/Min IV Continuous <Continuous>  ALBUTerol    0.083% 2.5milliGRAM(s) Nebulizer every 6 hours  digoxin  Injectable 0.25milliGRAM(s) IV Push every 6 hours  enoxaparin Injectable 60milliGRAM(s) SubCutaneous every 12 hours    MEDICATIONS  (PRN):  acetaminophen   Tablet. 650milliGRAM(s) Oral every 6 hours PRN Mild Pain (1 - 3)      PAST MEDICAL & SURGICAL HISTORY:  BPH (benign prostatic hyperplasia)  Hypercholesteremia  Hypertension  No significant past surgical history  Vital Signs Last 24 Hrs  T(C): 36.4, Max: 37.4 (06-12 @ 23:11)  T(F): 97.6, Max: 99.4 (06-12 @ 23:11)  HR: 112 (110 - 138)  BP: 110/77 (101/66 - 128/75)  BP(mean): --  RR: 18 (17 - 20)  SpO2: 95% (90% - 96%)

## 2017-06-13 NOTE — PROGRESS NOTE ADULT - ASSESSMENT
Ptis cinfused   w ADL compromised  w poor quality of life w cva and adv dementia  KPS<30  sggst comfort care, w DNR/ DNI

## 2017-06-13 NOTE — PROGRESS NOTE ADULT - ASSESSMENT
Continue present therapy  diet as tolerated  dc strickland- TOV  further care per family wishes- palliative ? NGT

## 2017-06-13 NOTE — PROGRESS NOTE ADULT - SUBJECTIVE AND OBJECTIVE BOX
Patient is a 84y old  Male who presents with a chief complaint of     INTERVAL HPI/OVERNIGHT EVENTS:  rapid rate rx with digoxin/amiodarone  lethargic  poor po intake  discussed tube feeding with daughter  full dose lovenox started    MEDICATIONS  (STANDING):  aspirin enteric coated 81milliGRAM(s) Oral daily  finasteride 5milliGRAM(s) Oral daily  tamsulosin 0.4milliGRAM(s) Oral at bedtime  simvastatin 20milliGRAM(s) Oral at bedtime  multivitamin 1Tablet(s) Oral daily  pantoprazole    Tablet 40milliGRAM(s) Oral before breakfast  piperacillin/tazobactam IVPB. 3.375Gram(s) IV Intermittent every 8 hours  ALPRAZolam 0.25milliGRAM(s) Oral two times a day  lactulose Syrup 10Gram(s) Oral daily  ALBUTerol    0.083% 2.5milliGRAM(s) Nebulizer every 6 hours  enoxaparin Injectable 60milliGRAM(s) SubCutaneous every 12 hours  amiodarone Infusion 0.5mG/Min IV Continuous <Continuous>  carvedilol 12.5milliGRAM(s) Oral every 12 hours  furosemide    Tablet 40milliGRAM(s) Oral daily    MEDICATIONS  (PRN):  acetaminophen   Tablet. 650milliGRAM(s) Oral every 6 hours PRN Mild Pain (1 - 3)        REVIEW OF SYSTEMS:  CONSTITUTIONAL: No fever, weight loss, or fatigue  EYES: No eye pain, visual disturbances, or discharge  ENMT:  No difficulty hearing, tinnitus, vertigo; No sinus or throat pain  NECK: No pain or stiffness  RESPIRATORY: No cough, wheezing, chills or hemoptysis; No shortness of breath  CARDIOVASCULAR: No chest pain, palpitations, dizziness, or leg swelling  GASTROINTESTINAL: No abdominal or epigastric pain. No nausea, vomiting, or hematemesis; No diarrhea or constipation. No melena or hematochezia.  GENITOURINARY: No dysuria, frequency, hematuria, or incontinence  NEUROLOGICAL: No headaches, memory loss, loss of strength, numbness, or tremors  SKIN: No itching, burning, rashes, or lesions      Vital Signs Last 24 Hrs  T(C): 36.9, Max: 37.2 (06-13 @ 16:46)  T(F): 98.4, Max: 98.9 (06-13 @ 16:46)  HR: 82 (82 - 138)  BP: 98/59 (98/59 - 117/73)  BP(mean): --  RR: 22 (18 - 22)  SpO2: 95% (92% - 96%)    PHYSICAL EXAM:  GENERAL: NAD, well-groomed, well-developed  HEAD:  Atraumatic, Normocephalic  EYES: EOMI, PERRLA, conjunctiva and sclera clear  ENMT: No tonsillar erythema, exudates, or enlargement; Moist mucous membranes   NECK: Supple, No JVD   NERVOUS SYSTEM:  Alert & Oriented X3, Good concentration; Motor Strength 5/5 B/L upper and lower extremities; DTRs 2+ intact and symmetric  CHEST/LUNG: Clear to percussion bilaterally; No rales, mild rhonchi, wheezing, no rubs  HEART: Regular rate and rhythm; No murmurs, rubs, or gallops  ABDOMEN: Soft, Nontender, Nondistended; Bowel sounds present  EXTREMITIES:  2+ Peripheral Pulses, No clubbing, cyanosis, or edema  LYMPH: No lymphadenopathy noted  SKIN: No rashes or lesions    LABS:                        11.6   8.7   )-----------( 213      ( 12 Jun 2017 17:37 )             33.6     06-12    143  |  109<H>  |  23  ----------------------------<  124<H>  3.8   |  23  |  0.99    Ca    8.5      12 Jun 2017 17:37  Phos  3.0     06-12  Mg     2.3     06-12          CAPILLARY BLOOD GLUCOSE      RADIOLOGY & ADDITIONAL TESTS:    Imaging Personally Reviewed:  [ ] YES  [ ] NO    Consultant(s) Notes Reviewed:  [ ] YES  [ ] NO    Care Discussed with Consultants/Other Providers [ ] YES  [ ] NO

## 2017-06-14 NOTE — GOALS OF CARE CONVERSATION - PERSONAL ADVANCE DIRECTIVE - CONVERSATION DETAILS
Met with dtr Christen Pearce today 6/14/17. We discussed MOLST at length, pt was just had swallow eval.  Spoke with speech & she informed me that pt failed.  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  Dtr returned good understanding of MOLST, & we discussed GOC. Dtr just wants to speak with 2 siblings before completing MOLST, but because she just lost mother she believes that they will all be in agreement for DNR/DNI & no Peg.

## 2017-06-14 NOTE — SWALLOW BEDSIDE ASSESSMENT ADULT - SLP GENERAL OBSERVATIONS
pt seen bedside, fairly alert given tactile/auditory cues and oriented to ?self. pt nonverbal, essentially noncommunicative except some phonation- which was unintelligible and he smiled and nodded "no". he did not follow directions. PA at the bedside

## 2017-06-14 NOTE — PROGRESS NOTE ADULT - SUBJECTIVE AND OBJECTIVE BOX
Patient is a 84y old  Male who presents with a chief complaint of       PAST MEDICAL & SURGICAL HISTORY:  BPH (benign prostatic hyperplasia)  Hypercholesteremia  Hypertension  No significant past surgical history      Summary of admission HPI: Afib SOB                PREVIOUS DIAGNOSTIC TESTING:      ECHO  FINDINGS:    STRESS  FINDINGS:    CATHETERIZATION  FINDINGS:    ELECTROPHYSIOLOGY STUDY  FINDINGS:    CAROTID ULTRASOUND:  FINDINGS    VENOUS DUPLEX SCAN:  FINDINGS:    CHEST CT PULMONARY ANGIO with IV Contrast:  FINDINGS:  MEDICATIONS  (STANDING):  aspirin enteric coated 81milliGRAM(s) Oral daily  finasteride 5milliGRAM(s) Oral daily  tamsulosin 0.4milliGRAM(s) Oral at bedtime  simvastatin 20milliGRAM(s) Oral at bedtime  multivitamin 1Tablet(s) Oral daily  pantoprazole    Tablet 40milliGRAM(s) Oral before breakfast  piperacillin/tazobactam IVPB. 3.375Gram(s) IV Intermittent every 8 hours  ALPRAZolam 0.25milliGRAM(s) Oral two times a day  lactulose Syrup 10Gram(s) Oral daily  ALBUTerol    0.083% 2.5milliGRAM(s) Nebulizer every 6 hours  enoxaparin Injectable 60milliGRAM(s) SubCutaneous every 12 hours  amiodarone Infusion 0.5mG/Min IV Continuous <Continuous>  carvedilol 12.5milliGRAM(s) Oral every 12 hours  dextrose 5% + sodium chloride 0.45%. 1000milliLiter(s) IV Continuous <Continuous>    MEDICATIONS  (PRN):  acetaminophen   Tablet. 650milliGRAM(s) Oral every 6 hours PRN Mild Pain (1 - 3)      FAMILY HISTORY:  No pertinent family history in first degree relatives      SOCIAL HISTORY:    CIGARETTES:    ALCOHOL:    REVIEW OF SYSTEMS:    CONSTITUTIONAL: No fever, weight loss, chills, shakes, or fatigue  EYES: No eye pain, visual disturbances, or discharge  ENMT:  No difficulty hearing, tinnitus, vertigo; No sinus or throat pain  NECK: No pain or stiffness  BREASTS: No pain, masses, or nipple discharge  RESPIRATORY: No cough, wheezing, hemoptysis, or shortness of breath  CARDIOVASCULAR: No chest pain, dyspnea, palpitations, dizziness, syncope, paroxysmal nocturnal dyspnea, orthopnea, or arm or leg swelling  GASTROINTESTINAL: No abdominal  or epigastric pain, nausea, vomiting, hematemesis, diarrhea, constipation, melena or bright red blood.  GENITOURINARY: No dysuria, nocturia, hematuria, or urinary incontinence  NEUROLOGICAL: No headaches, memory loss, slurred speech, limb weakness, loss of strength, numbness, or tremors  SKIN: No itching, burning, rashes, or lesions   LYMPH NODES: No enlarged glands  ENDOCRINE: No heat or cold intolerance, or hair loss  MUSCULOSKELETAL: No joint pain or swelling, muscle, back, or extremity pain  PSYCHIATRIC: No depression, anxiety, or difficulty sleeping  HEME/LYMPH: No easy bruising or bleeding gums  ALLERY AND IMMUNOLOGIC: No hives or rash.      Vital Signs Last 24 Hrs  T(C): 37.3, Max: 37.3 (06-14 @ 17:45)  T(F): 99.2, Max: 99.2 (06-14 @ 17:45)  HR: 88 (80 - 90)  BP: 90/50 (90/50 - 108/62)  BP(mean): --  RR: 18 (18 - 22)  SpO2: 94% (93% - 96%)    PHYSICAL EXAM:    GENERAL: In no apparent distress, well nourished, and hydrated.  HEAD:  Atraumatic, Normocephalic  EYES: EOMI, PERRLA, conjunctiva and sclera clear  ENMT: No tonsillar erythema, exudates, or enlargement; Moist mucous membranes, Good dentition, No lesions  NECK: Supple and normal thyroid.  No JVD or carotid bruit.  Carotid pulse is 2+ bilaterally.  HEART: Regular rate and rhythm; No murmurs, rubs, or gallops.  PULMONARY: rales  to auscultation and perfusion.  No rales, wheezing, or rhonchi bilaterally.  ABDOMEN: Soft, Nontender, Nondistended; Bowel sounds present  EXTREMITIES:  2+ Peripheral Pulses, No clubbing, cyanosis, or edema  LYMPH: No lymphadenopathy noted  NEUROLOGICAL: Grossly nonfocal          INTERPRETATION OF TELEMETRY:    ECG:    I&O's Detail    I & Os for current day (as of 14 Jun 2017 21:04)  =============================================  IN:    Solution: 200 ml    amiodarone Infusion: 193.9 ml    amiodarone Infusion: 80.4 ml    Oral Fluid: 50 ml    Total IN: 524.3 ml  ---------------------------------------------  OUT:    Indwelling Catheter - Urethral: 1400 ml    Total OUT: 1400 ml  ---------------------------------------------  Total NET: -875.7 ml      LABS:                  BNP  I&O's Detail    I & Os for current day (as of 14 Jun 2017 21:04)  =============================================  IN:    Solution: 200 ml    amiodarone Infusion: 193.9 ml    amiodarone Infusion: 80.4 ml    Oral Fluid: 50 ml    Total IN: 524.3 ml  ---------------------------------------------  OUT:    Indwelling Catheter - Urethral: 1400 ml    Total OUT: 1400 ml  ---------------------------------------------  Total NET: -875.7 ml    Daily     Daily     RADIOLOGY & ADDITIONAL STUDIES:

## 2017-06-14 NOTE — SWALLOW BEDSIDE ASSESSMENT ADULT - SLP PERTINENT HISTORY OF CURRENT PROBLEM
Patient with history of HTN, HLD, CVA advanced dementia, and indwelling strickland cath (since CVA) presents to the ED for generalized weakness and abdominal pain.  Patient was complaining to his sons about wanting to have a bowel movement, but no diarrhea or melena noted.  Son noted that he was breathing differently yesterday, but has not been coughing. (10 Khanh 2017 14:55)

## 2017-06-14 NOTE — GOALS OF CARE CONVERSATION - PERSONAL ADVANCE DIRECTIVE - WHAT MATTERS MOST
Dtr will speak with other siblings first but believes that they will all be in agreement for DNR/DNI & no Peg.  I will follow up in AM.

## 2017-06-14 NOTE — SWALLOW BEDSIDE ASSESSMENT ADULT - SWALLOW EVAL: DIAGNOSIS
pt presented with severe oropharyngeal dysphagia marked by reduced cognition/refusal behavior therefore limiting the eval, however noted oral holding, severe delay in swallow trigger with reduced laryngeal elevation- cough before and after the swallow trigger. pt not safe for po diet at this time.

## 2017-06-14 NOTE — GOALS OF CARE CONVERSATION - PERSONAL ADVANCE DIRECTIVE - TREATMENT GUIDELINE COMMENT
Dtr informed me that family would like pt to come home with comfort care & will complete MOLST.  I told dtr I will follow up in am as to wishes for DNR/DNI, & Peg.

## 2017-06-14 NOTE — PROGRESS NOTE ADULT - SUBJECTIVE AND OBJECTIVE BOX
Pt looks comfortable   but suffering index is high  MOLST  discussed  waiting for final decision  cont current care  overall prognosis is v poor

## 2017-06-15 NOTE — PROGRESS NOTE ADULT - SUBJECTIVE AND OBJECTIVE BOX
HPI:  ? Chief Complaint: The patient is a 84y Male complaining of urinary catheter complications.  ? HPI Objective Statement: Pertinent PMH/PSH/FHx/SHx and Review of Systems contained within:  Patient with history of HTN, HLD, CVA advanced dementia, and indwelling strickland cath (since CVA) presents to the ED for generalized weakness and abdominal pain.  Patient was complaining to his sons about wanting to have a bowel movement, but no diarrhea or melena noted.  Son noted that he was breathing differently yesterday, but has not been coughing. (10 Khanh 2017 14:55)

## 2017-06-15 NOTE — PROGRESS NOTE ADULT - SUBJECTIVE AND OBJECTIVE BOX
Patient is a 84y old  Male who presents with a chief complaint of     INTERVAL HPI/OVERNIGHT EVENTS:  repeat chest x-ray with multifocal pneumonia; not improved on imaging  daughter refused ng tube placement today  pt tentatively scheduled for peg tube placement 6/16/2017  sedated; s/p ativan given for agitation  broad spectrum antibiotic coverage extended      MEDICATIONS  (STANDING):  aspirin enteric coated 81milliGRAM(s) Oral daily  finasteride 5milliGRAM(s) Oral daily  tamsulosin 0.4milliGRAM(s) Oral at bedtime  simvastatin 20milliGRAM(s) Oral at bedtime  multivitamin 1Tablet(s) Oral daily  pantoprazole    Tablet 40milliGRAM(s) Oral before breakfast  piperacillin/tazobactam IVPB. 3.375Gram(s) IV Intermittent every 8 hours  ALPRAZolam 0.25milliGRAM(s) Oral two times a day  lactulose Syrup 10Gram(s) Oral daily  ALBUTerol    0.083% 2.5milliGRAM(s) Nebulizer every 6 hours  enoxaparin Injectable 60milliGRAM(s) SubCutaneous every 12 hours  amiodarone Infusion 0.5mG/Min IV Continuous <Continuous>  carvedilol 12.5milliGRAM(s) Oral every 12 hours  furosemide    Tablet 40milliGRAM(s) Oral daily    MEDICATIONS  (PRN):  acetaminophen   Tablet. 650milliGRAM(s) Oral every 6 hours PRN Mild Pain (1 - 3)        REVIEW OF SYSTEMS:  CONSTITUTIONAL: No fever, weight loss, or fatigue  EYES: No eye pain, visual disturbances, or discharge  ENMT:  No difficulty hearing, tinnitus, vertigo; No sinus or throat pain  NECK: No pain or stiffness  RESPIRATORY: No cough, wheezing, chills or hemoptysis; No shortness of breath  CARDIOVASCULAR: No chest pain, palpitations, dizziness, or leg swelling  GASTROINTESTINAL: No abdominal or epigastric pain. No nausea, vomiting, or hematemesis; No diarrhea or constipation. No melena or hematochezia.  GENITOURINARY: No dysuria, frequency, hematuria, or incontinence  NEUROLOGICAL: No headaches, memory loss, loss of strength, numbness, or tremors  SKIN: No itching, burning, rashes, or lesions      Vital Signs Last 24 Hrs  T(C): 36.9, Max: 37.2 (06-13 @ 16:46)  T(F): 98.4, Max: 98.9 (06-13 @ 16:46)  HR: 82 (82 - 138)  BP: 98/59 (98/59 - 117/73)  BP(mean): --  RR: 22 (18 - 22)  SpO2: 95% (92% - 96%)    PHYSICAL EXAM:  GENERAL: NAD, well-groomed, well-developed  HEAD:  Atraumatic, Normocephalic  EYES: EOMI, PERRLA, conjunctiva and sclera clear  ENMT: No tonsillar erythema, exudates, or enlargement; Moist mucous membranes   NECK: Supple, No JVD   NERVOUS SYSTEM:  Alert & Oriented X3, Good concentration; Motor Strength 5/5 B/L upper and lower extremities; DTRs 2+ intact and symmetric  CHEST/LUNG: Clear to percussion bilaterally; No rales, mild rhonchi, wheezing, no rubs  HEART: Regular rate and rhythm; No murmurs, rubs, or gallops  ABDOMEN: Soft, Nontender, Nondistended; Bowel sounds present  EXTREMITIES:  2+ Peripheral Pulses, No clubbing, cyanosis, or edema  LYMPH: No lymphadenopathy noted  SKIN: No rashes or lesions    LABS:                        11.6   8.7   )-----------( 213      ( 12 Jun 2017 17:37 )             33.6     06-12    143  |  109<H>  |  23  ----------------------------<  124<H>  3.8   |  23  |  0.99    Ca    8.5      12 Jun 2017 17:37  Phos  3.0     06-12  Mg     2.3     06-12          CAPILLARY BLOOD GLUCOSE      RADIOLOGY & ADDITIONAL TESTS:    Imaging Personally Reviewed:  [ ] YES  [ ] NO    Consultant(s) Notes Reviewed:  [ ] YES  [ ] NO    Care Discussed with Consultants/Other Providers [ ] YES  [ ] NO Patient is a 84y old  Male who presents with a chief complaint of     INTERVAL HPI/OVERNIGHT EVENTS:  repeat chest x-ray with multifocal pneumonia; not improved on imaging  daughter refused ng tube placement today  pt tentatively scheduled for peg tube placement 6/16/2017  sedated; s/p ativan given for agitation  broad spectrum antibiotic coverage extended  medically cleared for peg tube placement      MEDICATIONS  (STANDING):  aspirin enteric coated 81milliGRAM(s) Oral daily  finasteride 5milliGRAM(s) Oral daily  tamsulosin 0.4milliGRAM(s) Oral at bedtime  simvastatin 20milliGRAM(s) Oral at bedtime  multivitamin 1Tablet(s) Oral daily  pantoprazole    Tablet 40milliGRAM(s) Oral before breakfast  piperacillin/tazobactam IVPB. 3.375Gram(s) IV Intermittent every 8 hours  ALPRAZolam 0.25milliGRAM(s) Oral two times a day  lactulose Syrup 10Gram(s) Oral daily  ALBUTerol    0.083% 2.5milliGRAM(s) Nebulizer every 6 hours  enoxaparin Injectable 60milliGRAM(s) SubCutaneous every 12 hours  amiodarone Infusion 0.5mG/Min IV Continuous <Continuous>  carvedilol 12.5milliGRAM(s) Oral every 12 hours  furosemide    Tablet 40milliGRAM(s) Oral daily    MEDICATIONS  (PRN):  acetaminophen   Tablet. 650milliGRAM(s) Oral every 6 hours PRN Mild Pain (1 - 3)        REVIEW OF SYSTEMS:  CONSTITUTIONAL: No fever, weight loss, or fatigue  EYES: No eye pain, visual disturbances, or discharge  ENMT:  No difficulty hearing, tinnitus, vertigo; No sinus or throat pain  NECK: No pain or stiffness  RESPIRATORY: No cough, wheezing, chills or hemoptysis; No shortness of breath  CARDIOVASCULAR: No chest pain, palpitations, dizziness, or leg swelling  GASTROINTESTINAL: No abdominal or epigastric pain. No nausea, vomiting, or hematemesis; No diarrhea or constipation. No melena or hematochezia.  GENITOURINARY: No dysuria, frequency, hematuria, or incontinence  NEUROLOGICAL: No headaches, memory loss, loss of strength, numbness, or tremors  SKIN: No itching, burning, rashes, or lesions      Vital Signs Last 24 Hrs  T(C): 36.9, Max: 37.2 (06-13 @ 16:46)  T(F): 98.4, Max: 98.9 (06-13 @ 16:46)  HR: 82 (82 - 138)  BP: 98/59 (98/59 - 117/73)  BP(mean): --  RR: 22 (18 - 22)  SpO2: 95% (92% - 96%)    PHYSICAL EXAM:  GENERAL: NAD, well-groomed, well-developed  HEAD:  Atraumatic, Normocephalic  EYES: EOMI, PERRLA, conjunctiva and sclera clear  ENMT: No tonsillar erythema, exudates, or enlargement; Moist mucous membranes   NECK: Supple, No JVD   NERVOUS SYSTEM:  Alert & Oriented X3, Good concentration; Motor Strength 5/5 B/L upper and lower extremities; DTRs 2+ intact and symmetric  CHEST/LUNG: Clear to percussion bilaterally; No rales, mild rhonchi, wheezing, no rubs  HEART: Regular rate and rhythm; No murmurs, rubs, or gallops  ABDOMEN: Soft, Nontender, Nondistended; Bowel sounds present  EXTREMITIES:  2+ Peripheral Pulses, No clubbing, cyanosis, or edema  LYMPH: No lymphadenopathy noted  SKIN: No rashes or lesions    LABS:                        11.6   8.7   )-----------( 213      ( 12 Jun 2017 17:37 )             33.6     06-12    143  |  109<H>  |  23  ----------------------------<  124<H>  3.8   |  23  |  0.99    Ca    8.5      12 Jun 2017 17:37  Phos  3.0     06-12  Mg     2.3     06-12          CAPILLARY BLOOD GLUCOSE      RADIOLOGY & ADDITIONAL TESTS:    Imaging Personally Reviewed:  [ ] YES  [ ] NO    Consultant(s) Notes Reviewed:  [ ] YES  [ ] NO    Care Discussed with Consultants/Other Providers [ ] YES  [ ] NO

## 2017-06-16 NOTE — PROGRESS NOTE ADULT - SUBJECTIVE AND OBJECTIVE BOX
Progress Note:   · Provider Specialty	Palliative Care	      · Subjective and Objective: 	  HPI:  ? Chief Complaint: The patient is a 84y Male complaining of urinary catheter complications.  ? HPI Objective Statement: Pertinent PMH/PSH/FHx/SHx and Review of Systems contained within:  Patient with history of HTN, HLD, CVA advanced dementia, and indwelling strickland cath (since CVA) presents to the ED for generalized weakness and abdominal pain.  Patient was complaining to his sons about wanting to have a bowel movement, but no diarrhea or melena noted.  Son noted that he was breathing differently yesterday, but has not been coughing. (10 Khanh 2017 14:55)        Assessment and Plan:   · Assessment		  overall prog poor   sx controled   pt is DNR/DNIr    Electronic Signatures:  Yumi Jalloh)  (

## 2017-06-16 NOTE — PROGRESS NOTE ADULT - SUBJECTIVE AND OBJECTIVE BOX
Patient is a 84y old  Male who presents with a chief complaint of       PAST MEDICAL & SURGICAL HISTORY:  BPH (benign prostatic hyperplasia)  Hypercholesteremia  Hypertension  No significant past surgical history      Summary of admission HPI: NSTEMI CHF Afib                PREVIOUS DIAGNOSTIC TESTING:      ECHO  FINDINGS:    STRESS  FINDINGS:    CATHETERIZATION  FINDINGS:    ELECTROPHYSIOLOGY STUDY  FINDINGS:    CAROTID ULTRASOUND:  FINDINGS    VENOUS DUPLEX SCAN:  FINDINGS:    CHEST CT PULMONARY ANGIO with IV Contrast:  FINDINGS:  MEDICATIONS  (STANDING):  aspirin enteric coated 81milliGRAM(s) Oral daily  finasteride 5milliGRAM(s) Oral daily  tamsulosin 0.4milliGRAM(s) Oral at bedtime  simvastatin 20milliGRAM(s) Oral at bedtime  multivitamin 1Tablet(s) Oral daily  pantoprazole    Tablet 40milliGRAM(s) Oral before breakfast  lactulose Syrup 10Gram(s) Oral daily  ALBUTerol    0.083% 2.5milliGRAM(s) Nebulizer every 6 hours  amiodarone Infusion 0.5mG/Min IV Continuous <Continuous>  carvedilol 12.5milliGRAM(s) Oral every 12 hours  acetaminophen  Suppository. 650milliGRAM(s) Rectal three times a day  enoxaparin Injectable 40milliGRAM(s) SubCutaneous daily  meropenem IVPB 500milliGRAM(s) IV Intermittent every 8 hours  vancomycin  IVPB 750milliGRAM(s) IV Intermittent every 12 hours  levoFLOXacin IVPB  IV Intermittent     MEDICATIONS  (PRN):  LORazepam   Injectable 0.5milliGRAM(s) IV Push two times a day PRN Agitation      FAMILY HISTORY:  No pertinent family history in first degree relatives      SOCIAL HISTORY:    CIGARETTES:    ALCOHOL:    REVIEW OF SYSTEMS:    CONSTITUTIONAL: No fever, weight loss, chills, shakes, or fatigue  EYES: No eye pain, visual disturbances, or discharge  ENMT:  No difficulty hearing, tinnitus, vertigo; No sinus or throat pain  NECK: No pain or stiffness  BREASTS: No pain, masses, or nipple discharge  RESPIRATORY: No cough, wheezing, hemoptysis, or shortness of breath  CARDIOVASCULAR: No chest pain, dyspnea, palpitations, dizziness, syncope, paroxysmal nocturnal dyspnea, orthopnea, or arm or leg swelling  GASTROINTESTINAL: No abdominal  or epigastric pain, nausea, vomiting, hematemesis, diarrhea, constipation, melena or bright red blood.  GENITOURINARY: No dysuria, nocturia, hematuria, or urinary incontinence  NEUROLOGICAL: No headaches, memory loss, slurred speech, limb weakness, loss of strength, numbness, or tremors  SKIN: No itching, burning, rashes, or lesions   LYMPH NODES: No enlarged glands  ENDOCRINE: No heat or cold intolerance, or hair loss  MUSCULOSKELETAL: No joint pain or swelling, muscle, back, or extremity pain  PSYCHIATRIC: No depression, anxiety, or difficulty sleeping  HEME/LYMPH: No easy bruising or bleeding gums  ALLERY AND IMMUNOLOGIC: No hives or rash.      Vital Signs Last 24 Hrs  T(C): 37.1, Max: 37.3 (06-15 @ 22:51)  T(F): 98.8, Max: 99.2 (06-15 @ 22:51)  HR: 113 (85 - 113)  BP: 135/90 (99/56 - 142/84)  BP(mean): --  RR: 20 (18 - 23)  SpO2: 92% (92% - 98%)    PHYSICAL EXAM:    GENERAL: In no apparent distress, well nourished, and hydrated.  HEAD:  Atraumatic, Normocephalic  EYES: EOMI, PERRLA, conjunctiva and sclera clear  ENMT: No tonsillar erythema, exudates, or enlargement; Moist mucous membranes, Good dentition, No lesions  NECK: Supple and normal thyroid.  No JVD or carotid bruit.  Carotid pulse is 2+ bilaterally.  HEART: Regular rate and rhythm; No murmurs, rubs, or gallops.  PULMONARY: Clear to auscultation and perfusion.  No rales, wheezing, or rhonchi bilaterally.  ABDOMEN: Soft, Nontender, Nondistended; Bowel sounds present  EXTREMITIES:  2+ Peripheral Pulses, No clubbing, cyanosis, or edema  LYMPH: No lymphadenopathy noted  NEUROLOGICAL: Grossly nonfocal          INTERPRETATION OF TELEMETRY:    ECG:    I&O's Detail  I & Os for 24h ending 16 Jun 2017 07:00  =============================================  IN:    dextrose 5% + sodium chloride 0.45%.: 600 ml    Solution: 250 ml    Solution: 200 ml    amiodarone Infusion: 199 ml    Total IN: 1249 ml  ---------------------------------------------  OUT:    Indwelling Catheter - Urethral: 650 ml    Total OUT: 650 ml  ---------------------------------------------  Total NET: 599 ml    I & Os for current day (as of 16 Jun 2017 20:56)  =============================================  IN:    Total IN: 0 ml  ---------------------------------------------  OUT:    Indwelling Catheter - Urethral: 250 ml    Total OUT: 250 ml  ---------------------------------------------  Total NET: -250 ml      LABS:                        11.3   10.8  )-----------( 214      ( 16 Jun 2017 08:09 )             33.3     06-16    138  |  105  |  22  ----------------------------<  151<H>  3.5   |  24  |  0.88    Ca    8.2<L>      16 Jun 2017 08:09              BNP  I&O's Detail  I & Os for 24h ending 16 Jun 2017 07:00  =============================================  IN:    dextrose 5% + sodium chloride 0.45%.: 600 ml    Solution: 250 ml    Solution: 200 ml    amiodarone Infusion: 199 ml    Total IN: 1249 ml  ---------------------------------------------  OUT:    Indwelling Catheter - Urethral: 650 ml    Total OUT: 650 ml  ---------------------------------------------  Total NET: 599 ml    I & Os for current day (as of 16 Jun 2017 20:56)  =============================================  IN:    Total IN: 0 ml  ---------------------------------------------  OUT:    Indwelling Catheter - Urethral: 250 ml    Total OUT: 250 ml  ---------------------------------------------  Total NET: -250 ml    Daily     Daily     RADIOLOGY & ADDITIONAL STUDIES:

## 2017-06-16 NOTE — CONSULT NOTE ADULT - ASSESSMENT
community acquired pneumonia   likely aspiration   obtunded today   will extend antibiotics   add atypical coverage ; on zosyn since admission   vanco adjusted   will follow with you thanks

## 2017-06-16 NOTE — PROGRESS NOTE ADULT - SUBJECTIVE AND OBJECTIVE BOX
Patient is a 84y old  Male who presents with a chief complaint of     INTERVAL HPI/OVERNIGHT EVENTS:  6/16/2017: s/p peg tube placement  post op stable  daughter at bedside  tube feeding to start at 2pm      MEDICATIONS  (STANDING):  aspirin enteric coated 81milliGRAM(s) Oral daily  finasteride 5milliGRAM(s) Oral daily  tamsulosin 0.4milliGRAM(s) Oral at bedtime  simvastatin 20milliGRAM(s) Oral at bedtime  multivitamin 1Tablet(s) Oral daily  pantoprazole    Tablet 40milliGRAM(s) Oral before breakfast  piperacillin/tazobactam IVPB. 3.375Gram(s) IV Intermittent every 8 hours  ALPRAZolam 0.25milliGRAM(s) Oral two times a day  lactulose Syrup 10Gram(s) Oral daily  ALBUTerol    0.083% 2.5milliGRAM(s) Nebulizer every 6 hours  enoxaparin Injectable 60milliGRAM(s) SubCutaneous every 12 hours  amiodarone Infusion 0.5mG/Min IV Continuous <Continuous>  carvedilol 12.5milliGRAM(s) Oral every 12 hours  furosemide    Tablet 40milliGRAM(s) Oral daily    MEDICATIONS  (PRN):  acetaminophen   Tablet. 650milliGRAM(s) Oral every 6 hours PRN Mild Pain (1 - 3)        REVIEW OF SYSTEMS:  CONSTITUTIONAL: No fever, weight loss, or fatigue  EYES: No eye pain, visual disturbances, or discharge  ENMT:  No difficulty hearing, tinnitus, vertigo; No sinus or throat pain  NECK: No pain or stiffness  RESPIRATORY: No cough, wheezing, chills or hemoptysis; No shortness of breath  CARDIOVASCULAR: No chest pain, palpitations, dizziness, or leg swelling  GASTROINTESTINAL: No abdominal or epigastric pain. No nausea, vomiting, or hematemesis; No diarrhea or constipation. No melena or hematochezia.  GENITOURINARY: No dysuria, frequency, hematuria, or incontinence  NEUROLOGICAL: No headaches, memory loss, loss of strength, numbness, or tremors  SKIN: No itching, burning, rashes, or lesions      Vital Signs Last 24 Hrs  T(C): 36.9, Max: 37.2 (06-13 @ 16:46)  T(F): 98.4, Max: 98.9 (06-13 @ 16:46)  HR: 82 (82 - 138)  BP: 98/59 (98/59 - 117/73)  BP(mean): --  RR: 22 (18 - 22)  SpO2: 95% (92% - 96%)    PHYSICAL EXAM:  GENERAL: NAD, well-groomed, well-developed  HEAD:  Atraumatic, Normocephalic  EYES: EOMI, PERRLA, conjunctiva and sclera clear  ENMT: No tonsillar erythema, exudates, or enlargement; Moist mucous membranes   NECK: Supple, No JVD   NERVOUS SYSTEM:  Alert & Oriented X3, Good concentration; Motor Strength 5/5 B/L upper and lower extremities; DTRs 2+ intact and symmetric  CHEST/LUNG: Clear to percussion bilaterally; No rales, mild rhonchi, wheezing, no rubs  HEART: Regular rate and rhythm; No murmurs, rubs, or gallops  ABDOMEN: Soft, Nontender, Nondistended; Bowel sounds present  EXTREMITIES:  2+ Peripheral Pulses, No clubbing, cyanosis, or edema  LYMPH: No lymphadenopathy noted  SKIN: No rashes or lesions    LABS:                        11.6   8.7   )-----------( 213      ( 12 Jun 2017 17:37 )             33.6     06-12    143  |  109<H>  |  23  ----------------------------<  124<H>  3.8   |  23  |  0.99    Ca    8.5      12 Jun 2017 17:37  Phos  3.0     06-12  Mg     2.3     06-12          CAPILLARY BLOOD GLUCOSE      RADIOLOGY & ADDITIONAL TESTS:    Imaging Personally Reviewed:  [ ] YES  [ ] NO    Consultant(s) Notes Reviewed:  [ ] YES  [ ] NO    Care Discussed with Consultants/Other Providers [ ] YES  [ ] NO

## 2017-06-16 NOTE — CHART NOTE - NSCHARTNOTEFT_GEN_A_CORE
Medicine PA    Requested by Dr. Doss to rescind DNR order prior to PEG placement with Dr. Bobby this morning. Discussed with patient's daughter at bedside, she is aware and agrees. She states she would not like any resuscitative measures, and makes her wishes clear that she does not want her father on a vent or intubated. Patient to be DNR after procedure.

## 2017-06-17 NOTE — PROGRESS NOTE ADULT - ASSESSMENT
community acquired pneumonia  resp distress  discussed with son by bedside   anu reviewed ; await for lyudmila the health care proxy

## 2017-06-17 NOTE — PROGRESS NOTE ADULT - SUBJECTIVE AND OBJECTIVE BOX
Patient is a 84y old  Male who presents with a chief complaint of SOB      PAST MEDICAL & SURGICAL HISTORY:  BPH (benign prostatic hyperplasia)  Hypercholesteremia  Hypertension  No significant past surgical history      Summary of admission HPI :SOB                PREVIOUS DIAGNOSTIC TESTING:      ECHO  FINDINGS:    STRESS  FINDINGS:    CATHETERIZATION  FINDINGS:    ELECTROPHYSIOLOGY STUDY  FINDINGS:    CAROTID ULTRASOUND:  FINDINGS    VENOUS DUPLEX SCAN:  FINDINGS:    CHEST CT PULMONARY ANGIO with IV Contrast:  FINDINGS:  MEDICATIONS  (STANDING):  aspirin enteric coated 81milliGRAM(s) Oral daily  finasteride 5milliGRAM(s) Oral daily  tamsulosin 0.4milliGRAM(s) Oral at bedtime  simvastatin 20milliGRAM(s) Oral at bedtime  multivitamin 1Tablet(s) Oral daily  pantoprazole    Tablet 40milliGRAM(s) Oral before breakfast  lactulose Syrup 10Gram(s) Oral daily  ALBUTerol    0.083% 2.5milliGRAM(s) Nebulizer every 6 hours  amiodarone Infusion 0.5mG/Min IV Continuous <Continuous>  carvedilol 12.5milliGRAM(s) Oral every 12 hours  acetaminophen  Suppository. 650milliGRAM(s) Rectal three times a day  enoxaparin Injectable 40milliGRAM(s) SubCutaneous daily  meropenem IVPB 500milliGRAM(s) IV Intermittent every 8 hours  vancomycin  IVPB 750milliGRAM(s) IV Intermittent every 12 hours  levoFLOXacin IVPB  IV Intermittent   levoFLOXacin IVPB 250milliGRAM(s) IV Intermittent every 24 hours  morphine Concentrate 2milliGRAM(s) Oral three times a day    MEDICATIONS  (PRN):  LORazepam   Injectable 0.5milliGRAM(s) IV Push two times a day PRN Agitation      FAMILY HISTORY:  No pertinent family history in first degree relatives      SOCIAL HISTORY:    CIGARETTES:    ALCOHOL:    REVIEW OF SYSTEMS:    CONSTITUTIONAL: No fever, weight loss, chills, shakes, or fatigue  EYES: No eye pain, visual disturbances, or discharge  ENMT:  No difficulty hearing, tinnitus, vertigo; No sinus or throat pain  NECK: No pain or stiffness  BREASTS: No pain, masses, or nipple discharge  RESPIRATORY: No cough, wheezing, hemoptysis, or shortness of breath  CARDIOVASCULAR: No chest pain, dyspnea, palpitations, dizziness, syncope, paroxysmal nocturnal dyspnea, orthopnea, or arm or leg swelling  GASTROINTESTINAL: No abdominal  or epigastric pain, nausea, vomiting, hematemesis, diarrhea, constipation, melena or bright red blood.  GENITOURINARY: No dysuria, nocturia, hematuria, or urinary incontinence  NEUROLOGICAL: No headaches, memory loss, slurred speech, limb weakness, loss of strength, numbness, or tremors  SKIN: No itching, burning, rashes, or lesions   LYMPH NODES: No enlarged glands  ENDOCRINE: No heat or cold intolerance, or hair loss  MUSCULOSKELETAL: No joint pain or swelling, muscle, back, or extremity pain  PSYCHIATRIC: No depression, anxiety, or difficulty sleeping  HEME/LYMPH: No easy bruising or bleeding gums  ALLERY AND IMMUNOLOGIC: No hives or rash.      Vital Signs Last 24 Hrs  T(C): 38, Max: 38 (06-17 @ 17:09)  T(F): 100.4, Max: 100.4 (06-17 @ 17:09)  HR: 114 (88 - 114)  BP: 119/87 (90/73 - 135/90)  BP(mean): --  RR: 22 (19 - 22)  SpO2: 90% (90% - 998%)    PHYSICAL EXAM:    GENERAL: In no apparent distress, well nourished, and hydrated.  HEAD:  Atraumatic, Normocephalic  EYES: EOMI, PERRLA, conjunctiva and sclera clear  ENMT: No tonsillar erythema, exudates, or enlargement; Moist mucous membranes, Good dentition, No lesions  NECK: Supple and normal thyroid.  No JVD or carotid bruit.  Carotid pulse is 2+ bilaterally.  HEART: Regular rate and rhythm; No murmurs, rubs, or gallops.  PULMONARY: Clear to auscultation and perfusion.  No rales, wheezing, or rhonchi bilaterally.  ABDOMEN: Soft, Nontender, Nondistended; Bowel sounds present  EXTREMITIES:  2+ Peripheral Pulses, No clubbing, cyanosis, or edema  LYMPH: No lymphadenopathy noted  NEUROLOGICAL: Grossly nonfocal          INTERPRETATION OF TELEMETRY:    ECG:    I&O's Detail  I & Os for 24h ending 17 Jun 2017 07:00  =============================================  IN:    Jevity: 340 ml    amiodarone Infusion: 199.2 ml    Solution: 150 ml    Total IN: 689.2 ml  ---------------------------------------------  OUT:    Indwelling Catheter - Urethral: 750 ml    Total OUT: 750 ml  ---------------------------------------------  Total NET: -60.8 ml    I & Os for current day (as of 17 Jun 2017 18:40)  =============================================  IN:    Oral Fluid: 80 ml    Total IN: 80 ml  ---------------------------------------------  OUT:    Total OUT: 0 ml  ---------------------------------------------  Total NET: 80 ml      LABS:                        11.7   14.7  )-----------( 258      ( 17 Jun 2017 07:14 )             34.8     06-17    139  |  105  |  26<H>  ----------------------------<  131<H>  3.7   |  24  |  1.20    Ca    8.5      17 Jun 2017 07:14              BNP  I&O's Detail  I & Os for 24h ending 17 Jun 2017 07:00  =============================================  IN:    Jevity: 340 ml    amiodarone Infusion: 199.2 ml    Solution: 150 ml    Total IN: 689.2 ml  ---------------------------------------------  OUT:    Indwelling Catheter - Urethral: 750 ml    Total OUT: 750 ml  ---------------------------------------------  Total NET: -60.8 ml    I & Os for current day (as of 17 Jun 2017 18:40)  =============================================  IN:    Oral Fluid: 80 ml    Total IN: 80 ml  ---------------------------------------------  OUT:    Total OUT: 0 ml  ---------------------------------------------  Total NET: 80 ml    Daily     Daily     RADIOLOGY & ADDITIONAL STUDIES:

## 2017-06-17 NOTE — PROGRESS NOTE ADULT - SUBJECTIVE AND OBJECTIVE BOX
Pt tolerated PEG placement well  on antibx  started on Jevity feedings      MEDICATIONS  (STANDING):  aspirin enteric coated 81milliGRAM(s) Oral daily  finasteride 5milliGRAM(s) Oral daily  tamsulosin 0.4milliGRAM(s) Oral at bedtime  simvastatin 20milliGRAM(s) Oral at bedtime  multivitamin 1Tablet(s) Oral daily  pantoprazole    Tablet 40milliGRAM(s) Oral before breakfast  lactulose Syrup 10Gram(s) Oral daily  ALBUTerol    0.083% 2.5milliGRAM(s) Nebulizer every 6 hours  amiodarone Infusion 0.5mG/Min IV Continuous <Continuous>  carvedilol 12.5milliGRAM(s) Oral every 12 hours  acetaminophen  Suppository. 650milliGRAM(s) Rectal three times a day  enoxaparin Injectable 40milliGRAM(s) SubCutaneous daily  meropenem IVPB 500milliGRAM(s) IV Intermittent every 8 hours  vancomycin  IVPB 750milliGRAM(s) IV Intermittent every 12 hours  levoFLOXacin IVPB  IV Intermittent   levoFLOXacin IVPB 250milliGRAM(s) IV Intermittent every 24 hours  morphine Concentrate 2milliGRAM(s) Oral three times a day    MEDICATIONS  (PRN):  LORazepam   Injectable 0.5milliGRAM(s) IV Push two times a day PRN Agitation      Allergies    morphine (Other)    Intolerances        Vital Signs Last 24 Hrs  T(C): 38, Max: 38 (06-17 @ 17:09)  T(F): 100.4, Max: 100.4 (06-17 @ 17:09)  HR: 114 (88 - 114)  BP: 119/87 (90/73 - 135/90)  BP(mean): --  RR: 22 (19 - 22)  SpO2: 90% (90% - 998%)    PHYSICAL EXAM:  General: NAD.  CVS: S1, S2  Chest: air entry bilaterally present  Abd: BS present, soft, non-tender, +peg      LABS:                        11.7   14.7  )-----------( 258      ( 17 Jun 2017 07:14 )             34.8     06-17    139  |  105  |  26<H>  ----------------------------<  131<H>  3.7   |  24  |  1.20    Ca    8.5      17 Jun 2017 07:14      Jevity feedings being advanced as tolerated  on antibiotics

## 2017-06-17 NOTE — PROGRESS NOTE ADULT - SUBJECTIVE AND OBJECTIVE BOX
Patient is a 84y old  Male who presents with a chief complaint of     INTERVAL HPI/OVERNIGHT EVENTS:    sleepy, daughter at bedside  mild resp difficulty  s/p peg    MEDICATIONS  (STANDING):  aspirin enteric coated 81milliGRAM(s) Oral daily  finasteride 5milliGRAM(s) Oral daily  tamsulosin 0.4milliGRAM(s) Oral at bedtime  simvastatin 20milliGRAM(s) Oral at bedtime  multivitamin 1Tablet(s) Oral daily  pantoprazole    Tablet 40milliGRAM(s) Oral before breakfast  lactulose Syrup 10Gram(s) Oral daily  ALBUTerol    0.083% 2.5milliGRAM(s) Nebulizer every 6 hours  amiodarone Infusion 0.5mG/Min IV Continuous <Continuous>  carvedilol 12.5milliGRAM(s) Oral every 12 hours  acetaminophen  Suppository. 650milliGRAM(s) Rectal three times a day  enoxaparin Injectable 40milliGRAM(s) SubCutaneous daily  meropenem IVPB 500milliGRAM(s) IV Intermittent every 8 hours  vancomycin  IVPB 750milliGRAM(s) IV Intermittent every 12 hours  levoFLOXacin IVPB  IV Intermittent   levoFLOXacin IVPB 250milliGRAM(s) IV Intermittent every 24 hours  morphine Concentrate 2milliGRAM(s) SubLingual three times a day    MEDICATIONS  (PRN):  LORazepam   Injectable 0.5milliGRAM(s) IV Push two times a day PRN Agitation        REVIEW OF SYSTEMS:  CONSTITUTIONAL: No fever, weight loss, or fatigue  EYES: No eye pain, visual disturbances, or discharge  ENMT:  No difficulty hearing, tinnitus, vertigo; No sinus or throat pain  NECK: No pain or stiffness  RESPIRATORY: No cough, wheezing, chills or hemoptysis; No shortness of breath  CARDIOVASCULAR: No chest pain, palpitations, dizziness, or leg swelling  GASTROINTESTINAL: No abdominal or epigastric pain. No nausea, vomiting, or hematemesis; No diarrhea or constipation. No melena or hematochezia.  GENITOURINARY: No dysuria, frequency, hematuria, or incontinence  NEUROLOGICAL: No headaches, memory loss, loss of strength, numbness, or tremors  SKIN: No itching, burning, rashes, or lesions      Vital Signs Last 24 Hrs  T(C): 36.9, Max: 37.1 (06-16 @ 19:46)  T(F): 98.4, Max: 98.8 (06-16 @ 19:46)  HR: 89 (85 - 113)  BP: 100/78 (90/73 - 135/90)  BP(mean): --  RR: 21 (19 - 21)  SpO2: 998% (92% - 998%)    PHYSICAL EXAM:  GENERAL: NAD, well-groomed, well-developed  HEAD:  Atraumatic, Normocephalic  EYES: EOMI, PERRLA, conjunctiva and sclera clear  ENMT: No tonsillar erythema, exudates, or enlargement; Moist mucous membranes   NECK: Supple, No JVD   NERVOUS SYSTEM:  Alert & Oriented X3, Good concentration; Motor Strength 5/5 B/L upper and lower extremities; DTRs 2+ intact and symmetric  CHEST/LUNG: Clear to percussion bilaterally; bilateral rales, few rhonchi, no wheezing, or rubs  HEART: Regular rate and rhythm; No murmurs, rubs, or gallops  ABDOMEN: Soft, Nontender, Nondistended; Bowel sounds present, PEG in situ  EXTREMITIES:  2+ Peripheral Pulses, No clubbing, cyanosis, or edema  LYMPH: No lymphadenopathy noted  SKIN: No rashes or lesions    LABS:                        11.7   14.7  )-----------( 258      ( 17 Jun 2017 07:14 )             34.8     06-17    139  |  105  |  26<H>  ----------------------------<  131<H>  3.7   |  24  |  1.20    Ca    8.5      17 Jun 2017 07:14          CAPILLARY BLOOD GLUCOSE      RADIOLOGY & ADDITIONAL TESTS:    Imaging Personally Reviewed:  [ ] YES  [ ] NO    Consultant(s) Notes Reviewed:  [ ] YES  [ ] NO    Care Discussed with Consultants/Other Providers [ ] YES  [ ] NO

## 2017-06-17 NOTE — CONSULT NOTE ADULT - SUBJECTIVE AND OBJECTIVE BOX
Progress Note:   · Provider Specialty	Palliative Care	      · Subjective and Objective: 	  Progress Note:   · Provider Specialty	Palliative Care	      · Subjective and Objective: 	  HPI:  ? Chief Complaint: The patient is a 84y Male complaining of urinary catheter complications.  ? HPI Objective Statement: Pertinent PMH/PSH/FHx/SHx and Review of Systems contained within:  Patient with history of HTN, HLD, CVA advanced dementia, and indwelling strickland cath (since CVA) presents to the ED for generalized weakness and abdominal pain.  Patient was complaining to his sons about wanting to have a bowel movement, but no diarrhea or melena noted.  Son noted that he was breathing differently yesterday, but has not been coughing. (10 Khanh 2017 14:55)        Assessment and Plan:   · Assessment		  overall prog poor   sx controled   pt is DNR/DNIr    Electronic Signatures:  Yumi Jalloh)  (
Full consult dictated        Plan: daughter signed consent for peg - will schedule for tomorrow
HPI:  ? Chief Complaint: The patient is a 84y Male complaining of urinary catheter complications.  Patient with history of HTN, HLD, CVA advanced dementia, and indwelling strickland cath (since CVA) presents to the ED for generalized weakness and abdominal pain.  Patient was complaining to his sons about wanting to have a bowel movement, but no diarrhea or melena noted.  Son noted that he was breathing differently yesterday, but has not been coughing. (10 Khanh 2017 14:55)  known to me from prev adm     PAST MEDICAL & SURGICAL HISTORY:  BPH (benign prostatic hyperplasia)  Hypercholesteremia  Hypertension  No significant past surgical history      SOCHX:  no  tobacco,  no-  alcohol    FMHX: FA/MO  non- contributory       Recent Travel:    Immunizations:    Allergies    morphine (Other)    Intolerances        MEDICATIONS  (STANDING):  aspirin enteric coated 81milliGRAM(s) Oral daily  finasteride 5milliGRAM(s) Oral daily  tamsulosin 0.4milliGRAM(s) Oral at bedtime  simvastatin 20milliGRAM(s) Oral at bedtime  multivitamin 1Tablet(s) Oral daily  pantoprazole    Tablet 40milliGRAM(s) Oral before breakfast  piperacillin/tazobactam IVPB. 3.375Gram(s) IV Intermittent every 8 hours  lactulose Syrup 10Gram(s) Oral daily  ALBUTerol    0.083% 2.5milliGRAM(s) Nebulizer every 6 hours  amiodarone Infusion 0.5mG/Min IV Continuous <Continuous>  carvedilol 12.5milliGRAM(s) Oral every 12 hours  acetaminophen  Suppository. 650milliGRAM(s) Rectal three times a day  vancomycin  IVPB 1000milliGRAM(s) IV Intermittent every 12 hours  enoxaparin Injectable 40milliGRAM(s) SubCutaneous daily    MEDICATIONS  (PRN):  LORazepam   Injectable 0.5milliGRAM(s) IV Push two times a day PRN Agitation      REVIEW OF SYSTEMS:  unable to obtain  gurgling with secretions   VITAL SIGNS:    T(C): 36.6, Max: 37.3 (06-15 @ 22:51)  T(F): 97.8, Max: 99.2 (06-15 @ 22:51)  HR: 94 (85 - 117)  BP: 113/66 (99/56 - 142/84)  RR: 20 (18 - 23)  SpO2: 97% (94% - 98%)  Wt(kg): --    PHYSICAL EXAM:    GENERAL: NAD, well-groomed, cachectic   HEAD:  Atraumatic, Normocephalic  EYES: EOMI, PERRLA, conjunctiva and sclera clear  ENMT: t;dry  mucous membranes, no teeth  NECK: Supple, No JVD, Normal thyroid  NERVOUS SYSTEM: unresponsive   CHEST/LUNG: gurgles throat  No rales, rhonchi, wheezing bilaterally  HEART: Regular rate and rhythm; No murmurs, rubs, or gallops  ABDOMEN: Soft, Nontender, Nondistended; Bowel sounds present  EXTREMITIES:  2+ Peripheral Pulses, No clubbing, cyanosis, or edema  LYMPH: No lymphadenopathy noted  SKIN: No rashes or lesions  BACK: no pressor sore   strickland   LABS:                         11.3   10.8  )-----------( 214      ( 16 Jun 2017 08:09 )             33.3     06-16    138  |  105  |  22  ----------------------------<  151<H>  3.5   |  24  |  0.88    Ca    8.2<L>      16 Jun 2017 08:09                                              Radiology:  MPRESSION:    Extensive multifocal pneumonia with no significant change.              PIERRE JULES M.D., ATTENDING RADIOLOGIST  This document has been electronically signed. Khanh 15 2017  9:31AM
Patient is a 84y old  Male who presents with a chief complaint of       PAST MEDICAL & SURGICAL HISTORY:  BPH (benign prostatic hyperplasia)  Hypercholesteremia  Hypertension  No significant past surgical history      Summary of admission HPI: SOB                PREVIOUS DIAGNOSTIC TESTING:      ECHO  FINDINGS:    STRESS  FINDINGS:    CATHETERIZATION  FINDINGS:    ELECTROPHYSIOLOGY STUDY  FINDINGS:    CAROTID ULTRASOUND:  FINDINGS    VENOUS DUPLEX SCAN:  FINDINGS:    CHEST CT PULMONARY ANGIO with IV Contrast:  FINDINGS:  MEDICATIONS  (STANDING):  heparin  Injectable 5000Unit(s) SubCutaneous every 12 hours  aspirin enteric coated 81milliGRAM(s) Oral daily  finasteride 5milliGRAM(s) Oral daily  tamsulosin 0.4milliGRAM(s) Oral at bedtime  simvastatin 20milliGRAM(s) Oral at bedtime  multivitamin 1Tablet(s) Oral daily  pantoprazole    Tablet 40milliGRAM(s) Oral before breakfast  piperacillin/tazobactam IVPB. 3.375Gram(s) IV Intermittent every 8 hours  sertraline 100milliGRAM(s) Oral daily  ALPRAZolam 0.25milliGRAM(s) Oral two times a day  risperiDONE   Tablet 1milliGRAM(s) Oral two times a day  lactulose Syrup 10Gram(s) Oral daily  lisinopril 10milliGRAM(s) Oral daily  clopidogrel Tablet 75milliGRAM(s) Oral daily    MEDICATIONS  (PRN):  acetaminophen   Tablet. 650milliGRAM(s) Oral every 6 hours PRN Mild Pain (1 - 3)      FAMILY HISTORY:  No pertinent family history in first degree relatives      SOCIAL HISTORY:    CIGARETTES:    ALCOHOL:    REVIEW OF SYSTEMS:    CONSTITUTIONAL: No fever, weight loss, chills, shakes, or fatigue  EYES: No eye pain, visual disturbances, or discharge  ENMT:  No difficulty hearing, tinnitus, vertigo; No sinus or throat pain  NECK: No pain or stiffness  BREASTS: No pain, masses, or nipple discharge  RESPIRATORY: No cough, wheezing, hemoptysis, or shortness of breath  CARDIOVASCULAR: No chest pain, dyspnea, palpitations, dizziness, syncope, paroxysmal nocturnal dyspnea, orthopnea, or arm or leg swelling  GASTROINTESTINAL: No abdominal  or epigastric pain, nausea, vomiting, hematemesis, diarrhea, constipation, melena or bright red blood.  GENITOURINARY: No dysuria, nocturia, hematuria, or urinary incontinence  NEUROLOGICAL: No headaches, memory loss, slurred speech, limb weakness, loss of strength, numbness, or tremors  SKIN: No itching, burning, rashes, or lesions   LYMPH NODES: No enlarged glands  ENDOCRINE: No heat or cold intolerance, or hair loss  MUSCULOSKELETAL: No joint pain or swelling, muscle, back, or extremity pain  PSYCHIATRIC: No depression, anxiety, or difficulty sleeping  HEME/LYMPH: No easy bruising or bleeding gums  ALLERY AND IMMUNOLOGIC: No hives or rash.      Vital Signs Last 24 Hrs  T(C): 37, Max: 37 (- @ 18:35)  T(F): 98.6, Max: 98.6 (06-09 @ 18:35)  HR: 100 (84 - 124)  BP: 90/54 (80/50 - 118/79)  BP(mean): --  RR: 19 (16 - 27)  SpO2: 97% (94% - 100%)    PHYSICAL EXAM:    GENERAL: In no apparent distress, well nourished, and hydrated.  HEAD:  Atraumatic, Normocephalic  EYES: EOMI, PERRLA, conjunctiva and sclera clear  ENMT: No tonsillar erythema, exudates, or enlargement; Moist mucous membranes, Good dentition, No lesions  NECK: Supple and normal thyroid.  No JVD or carotid bruit.  Carotid pulse is 2+ bilaterally.  HEART: Regular rate and rhythm; No murmurs, rubs, or gallops.  PULMONARY: Clear to auscultation and perfusion.  No rales, wheezing, or rhonchi bilaterally.  ABDOMEN: Soft, Nontender, Nondistended; Bowel sounds present  EXTREMITIES:  2+ Peripheral Pulses, No clubbing, cyanosis, or edema  LYMPH: No lymphadenopathy noted  NEUROLOGICAL: Grossly nonfocal          INTERPRETATION OF TELEMETRY:    ECG:    I&O's Detail  I & Os for 24h ending 10 Khanh 2017 07:00  =============================================  IN:    Solution: 200 ml    Oral Fluid: 100 ml    Total IN: 300 ml  ---------------------------------------------  OUT:    Indwelling Catheter - Urethral: 500 ml    Voided: 500 ml    Total OUT: 1000 ml  ---------------------------------------------  Total NET: -700 ml    I & Os for current day (as of 10 Khanh 2017 15:07)  =============================================  IN:    Oral Fluid: 420 ml    Total IN: 420 ml  ---------------------------------------------  OUT:    Total OUT: 0 ml  ---------------------------------------------  Total NET: 420 ml      LABS:                        10.9   9.4   )-----------( 180      ( 10 Khanh 2017 06:54 )             31.6     06-10    143  |  109<H>  |  31<H>  ----------------------------<  83  3.5   |  24  |  1.03    Ca    8.5      10 Khanh 2017 06:54  Phos  3.5     06-10  Mg     2.2     06-10    TPro  7.2  /  Alb  3.5  /  TBili  0.7  /  DBili  x   /  AST  35  /  ALT  23  /  AlkPhos  63  06-09    CARDIAC MARKERS ( 10 Khanh 2017 06:54 )  .526 ng/mL / x     / x     / x     / x      CARDIAC MARKERS ( 10 Khanh 2017 01:25 )  .572 ng/mL / x     / x     / x     / x      CARDIAC MARKERS ( 2017 16:35 )  .347 ng/mL / x     / x     / x     / x      CARDIAC MARKERS ( 2017 07:14 )  .399 ng/mL / x     / 75 U/L / x     / 3.5 ng/mL      PT/INR - ( 2017 07:14 )   PT: 15.3 sec;   INR: 1.39 ratio         PTT - ( 2017 07:14 )  PTT:39.0 sec  Urinalysis Basic - ( 2017 07:14 )    Color: Yellow / Appearance: very cloudy / S.020 / pH: x  Gluc: x / Ketone: Trace  / Bili: Negative / Urobili: Negative mg/dL   Blood: x / Protein: 500 mg/dL / Nitrite: Positive   Leuk Esterase: Moderate / RBC: 11-25 /HPF / WBC TNTC /HPF   Sq Epi: x / Non Sq Epi: Few / Bacteria: Moderate      BNP  I&O's Detail  I & Os for 24h ending 10 Khanh 2017 07:00  =============================================  IN:    Solution: 200 ml    Oral Fluid: 100 ml    Total IN: 300 ml  ---------------------------------------------  OUT:    Indwelling Catheter - Urethral: 500 ml    Voided: 500 ml    Total OUT: 1000 ml  ---------------------------------------------  Total NET: -700 ml    I & Os for current day (as of 10 Khanh 2017 15:07)  =============================================  IN:    Oral Fluid: 420 ml    Total IN: 420 ml  ---------------------------------------------  OUT:    Total OUT: 0 ml  ---------------------------------------------  Total NET: 420 ml    Daily     Daily     RADIOLOGY & ADDITIONAL STUDIES:

## 2017-06-17 NOTE — PROGRESS NOTE ADULT - SUBJECTIVE AND OBJECTIVE BOX
HPI:  ? Chief Complaint: The patient is a 84y Male complaining of urinary catheter complications.  ? HPI Objective Statement: Pertinent PMH/PSH/FHx/SHx and Review of Systems contained within:  Patient with history of HTN, HLD, CVA advanced dementia, and indwelling strickland cath (since CVA) presents to the ED for generalized weakness and abdominal pain.  Patient was complaining to his sons about wanting to have a bowel movement, but no diarrhea or melena noted.  Son noted that he was breathing differently yesterday, but has not been coughing. (10 Khanh 2017 14:55)      Allergies    morphine (Other)    Intolerances        MEDICATIONS  (STANDING):  aspirin enteric coated 81milliGRAM(s) Oral daily  finasteride 5milliGRAM(s) Oral daily  tamsulosin 0.4milliGRAM(s) Oral at bedtime  simvastatin 20milliGRAM(s) Oral at bedtime  multivitamin 1Tablet(s) Oral daily  pantoprazole    Tablet 40milliGRAM(s) Oral before breakfast  lactulose Syrup 10Gram(s) Oral daily  ALBUTerol    0.083% 2.5milliGRAM(s) Nebulizer every 6 hours  amiodarone Infusion 0.5mG/Min IV Continuous <Continuous>  carvedilol 12.5milliGRAM(s) Oral every 12 hours  acetaminophen  Suppository. 650milliGRAM(s) Rectal three times a day  enoxaparin Injectable 40milliGRAM(s) SubCutaneous daily  meropenem IVPB 500milliGRAM(s) IV Intermittent every 8 hours  vancomycin  IVPB 750milliGRAM(s) IV Intermittent every 12 hours  levoFLOXacin IVPB  IV Intermittent   levoFLOXacin IVPB 250milliGRAM(s) IV Intermittent every 24 hours  morphine Concentrate 2milliGRAM(s) Oral three times a day    MEDICATIONS  (PRN):  LORazepam   Injectable 0.5milliGRAM(s) IV Push two times a day PRN Agitation  morphine   Solution 2milliGRAM(s) Oral once PRN Moderate Pain (4 - 6)      REVIEW OF SYSTEMS:    CONSTITUTIONAL: No fever, chills, weight loss, or fatigue  HEENT: No sore throat, runny nose, ear ache  RESPIRATORY: No cough, wheezing, No shortness of breath  CARDIOVASCULAR: No chest pain, palpitations, dizziness  GASTROINTESTINAL: No abdominal pain. No nausea, vomiting, diarrhea  GENITOURINARY: No dysuria, increase frequency, hematuria, or incontinence  NEUROLOGICAL: No headaches, memory loss, loss of strength, numbness, or tremors, no weakness  EXTREMITY: No pedal edema BLE  SKIN: No itching, burning, rashes, or lesions     VITAL SIGNS:  T(C): 38, Max: 38 (06-17 @ 17:09)  T(F): 100.4, Max: 100.4 (06-17 @ 17:09)  HR: 114 (88 - 114)  BP: 119/87 (90/73 - 135/90)  RR: 22 (19 - 22)  SpO2: 90% (90% - 998%)  Wt(kg): --    PHYSICAL EXAM:    GENERAL: not in any distress  HEENT: Neck is supple, normocephalic, atraumatic   CHEST/LUNG: Clear to percussion bilaterally; No rales, rhonchi, wheezing  HEART: Regular rate and rhythm; No murmurs, rubs, or gallops  ABDOMEN: Soft, Nontender, Nondistended; Bowel sounds present, no rebound   EXTREMITIES:  2+ Peripheral Pulses, No clubbing, cyanosis, or edema  GENITOURINARY:   SKIN: No rashes or lesions  BACK: no pressor sore   NERVOUS SYSTEM:  Alert & Oriented X3, Good concentration  PSYCH: normal affect     LABS:                         11.7   14.7  )-----------( 258      ( 17 Jun 2017 07:14 )             34.8     06-17    139  |  105  |  26<H>  ----------------------------<  131<H>  3.7   |  24  |  1.20    Ca    8.5      17 Jun 2017 07:14                          Vancomycin Level, Trough: 14.9 ug/mL (06-17 @ 07:14)                      Radiology: HPI:  ? Chief Complaint: The patient is a 84y Male complaining of urinary catheter complications.  Patient with history of HTN, HLD, CVA advanced dementia, and indwelling strickland cath (since CVA) presents to the ED for generalized weakness and abdominal pain.  Patient was complaining to his sons about wanting to have a bowel movement, but no diarrhea or melena noted.  Son noted that he was breathing differently yesterday, but has not been coughing. (10 Khanh 2017 14:55)  today worse   congested   dnr/molst form reviewed with nurse    Allergies    morphine (Other)    Intolerances        MEDICATIONS  (STANDING):  aspirin enteric coated 81milliGRAM(s) Oral daily  finasteride 5milliGRAM(s) Oral daily  tamsulosin 0.4milliGRAM(s) Oral at bedtime  simvastatin 20milliGRAM(s) Oral at bedtime  multivitamin 1Tablet(s) Oral daily  pantoprazole    Tablet 40milliGRAM(s) Oral before breakfast  lactulose Syrup 10Gram(s) Oral daily  ALBUTerol    0.083% 2.5milliGRAM(s) Nebulizer every 6 hours  amiodarone Infusion 0.5mG/Min IV Continuous <Continuous>  carvedilol 12.5milliGRAM(s) Oral every 12 hours  acetaminophen  Suppository. 650milliGRAM(s) Rectal three times a day  enoxaparin Injectable 40milliGRAM(s) SubCutaneous daily  meropenem IVPB 500milliGRAM(s) IV Intermittent every 8 hours  vancomycin  IVPB 750milliGRAM(s) IV Intermittent every 12 hours  levoFLOXacin IVPB  IV Intermittent   levoFLOXacin IVPB 250milliGRAM(s) IV Intermittent every 24 hours  morphine Concentrate 2milliGRAM(s) Oral three times a day    MEDICATIONS  (PRN):  LORazepam   Injectable 0.5milliGRAM(s) IV Push two times a day PRN Agitation  morphine   Solution 2milliGRAM(s) Oral once PRN Moderate Pain (4 - 6)      REVIEW OF SYSTEMS:  unable to obtain   VITAL SIGNS:  T(C): 38, Max: 38 (06-17 @ 17:09)  T(F): 100.4, Max: 100.4 (06-17 @ 17:09)  HR: 114 (88 - 114)  BP: 119/87 (90/73 - 135/90)  RR: 22 (19 - 22)  SpO2: 90% (90% - 998%)  Wt(kg): --    PHYSICAL EXAM:    GENERAL: gurgles throat  HEENT: Neck is supple, normocephalic, atraumatic ;; very dry mouth  CHEST/LUNG: bl , rhonchi, wheezing  HEART: Regular rate and rhythm; No murmurs, rubs, or gallops  ABDOMEN: Soft, Nontender, Nondistended; Bowel sounds present, no rebound   EXTREMITIES:  2+ Peripheral Pulses, No clubbing, cyanosis, or edema  GENITOURINARY: NEGATIVE   SKIN: No rashes or lesions  BACK: no pressor sore   NERVOUS SYSTEM:  unresponsive ; opens eyes    LABS:                         11.7   14.7  )-----------( 258      ( 17 Jun 2017 07:14 )             34.8     06-17    139  |  105  |  26<H>  ----------------------------<  131<H>  3.7   |  24  |  1.20    Ca    8.5      17 Jun 2017 07:14                          Vancomycin Level, Trough: 14.9 ug/mL (06-17 @ 07:14)                      Radiology:

## 2017-06-18 NOTE — PROGRESS NOTE ADULT - SUBJECTIVE AND OBJECTIVE BOX
Patient is a 84y old  Male who presents with a chief complaint of     INTERVAL HPI/OVERNIGHT EVENTS:    rate controlled a fib  lethargic  morphine for sob. discomfort    MEDICATIONS  (STANDING):  aspirin enteric coated 81milliGRAM(s) Oral daily  finasteride 5milliGRAM(s) Oral daily  tamsulosin 0.4milliGRAM(s) Oral at bedtime  simvastatin 20milliGRAM(s) Oral at bedtime  multivitamin 1Tablet(s) Oral daily  pantoprazole    Tablet 40milliGRAM(s) Oral before breakfast  lactulose Syrup 10Gram(s) Oral daily  ALBUTerol    0.083% 2.5milliGRAM(s) Nebulizer every 6 hours  carvedilol 12.5milliGRAM(s) Oral every 12 hours  acetaminophen  Suppository. 650milliGRAM(s) Rectal three times a day  enoxaparin Injectable 40milliGRAM(s) SubCutaneous daily  meropenem IVPB 500milliGRAM(s) IV Intermittent every 8 hours  levoFLOXacin IVPB  IV Intermittent   levoFLOXacin IVPB 250milliGRAM(s) IV Intermittent every 24 hours  amiodarone    Tablet 200milliGRAM(s) Oral daily    MEDICATIONS  (PRN):  LORazepam   Injectable 0.5milliGRAM(s) IV Push two times a day PRN Agitation  morphine Concentrate 2milliGRAM(s) SubLingual every 6 hours PRN Mild Pain (1 - 3) and sob        REVIEW OF SYSTEMS:  CONSTITUTIONAL: No fever, weight loss, or fatigue  EYES: No eye pain, visual disturbances, or discharge  ENMT:  No difficulty hearing, tinnitus, vertigo; No sinus or throat pain  NECK: No pain or stiffness  RESPIRATORY: No cough, wheezing, chills or hemoptysis; No shortness of breath  CARDIOVASCULAR: No chest pain, palpitations, dizziness, or leg swelling  GASTROINTESTINAL: No abdominal or epigastric pain. No nausea, vomiting, or hematemesis; No diarrhea or constipation. No melena or hematochezia.  GENITOURINARY: No dysuria, frequency, hematuria, or incontinence  NEUROLOGICAL: No headaches, memory loss, loss of strength, numbness, or tremors  SKIN: No itching, burning, rashes, or lesions      Vital Signs Last 24 Hrs  T(C): 37.7, Max: 37.7 (06-18 @ 17:03)  T(F): 99.9, Max: 99.9 (06-18 @ 17:03)  HR: 92 (74 - 112)  BP: 114/68 (90/65 - 114/68)  BP(mean): --  RR: 18 (18 - 20)  SpO2: 93% (20% - 99%)    PHYSICAL EXAM:  GENERAL: NAD, well-groomed, well-developed  HEAD:  Atraumatic, Normocephalic  EYES: EOMI, PERRLA, conjunctiva and sclera clear  ENMT: No tonsillar erythema, exudates, or enlargement; Moist mucous membranes   NECK: Supple, No JVD   NERVOUS SYSTEM:  Alert & Oriented X3, Good concentration; Motor Strength 5/5 B/L upper and lower extremities; DTRs 2+ intact and symmetric  CHEST/LUNG: Clear to percussion bilaterally; few rales, rhonchi, wheezing, no rubs  HEART: Regular rate and rhythm; No murmurs, rubs, or gallops  ABDOMEN: Soft, Nontender, Nondistended; Bowel sounds present, Peg in place  EXTREMITIES:  2+ Peripheral Pulses, No clubbing, cyanosis, or edema  LYMPH: No lymphadenopathy noted  SKIN: No rashes or lesions    LABS:                        11.7   14.7  )-----------( 258      ( 17 Jun 2017 07:14 )             34.8     06-17    139  |  105  |  26<H>  ----------------------------<  131<H>  3.7   |  24  |  1.20    Ca    8.5      17 Jun 2017 07:14          CAPILLARY BLOOD GLUCOSE      RADIOLOGY & ADDITIONAL TESTS:    Imaging Personally Reviewed:  [ ] YES  [ ] NO    Consultant(s) Notes Reviewed:  [ ] YES  [ ] NO    Care Discussed with Consultants/Other Providers [ ] YES  [ ] NO

## 2017-06-18 NOTE — PROGRESS NOTE ADULT - ASSESSMENT
Continue present therapy  further care per family wishes- palliative/comfort measures  adjust morphine dose

## 2017-06-18 NOTE — PROGRESS NOTE ADULT - ASSESSMENT
community acquired pneumonia  resp distress  discussed with son by bedside   anu reviewed ; await for lyudmila the health care proxy community acquired pneumonia  resp distress  Altered Mental Status   dnr  continue supportive care

## 2017-06-18 NOTE — PROGRESS NOTE ADULT - SUBJECTIVE AND OBJECTIVE BOX
Patient is a 84y old  Male who presents with a chief complaint of       PAST MEDICAL & SURGICAL HISTORY:  BPH (benign prostatic hyperplasia)  Hypercholesteremia  Hypertension  No significant past surgical history      Summary of admission HPI: NSTEMI                 PREVIOUS DIAGNOSTIC TESTING:      ECHO  FINDINGS:    STRESS  FINDINGS:    CATHETERIZATION  FINDINGS:    ELECTROPHYSIOLOGY STUDY  FINDINGS:    CAROTID ULTRASOUND:  FINDINGS    VENOUS DUPLEX SCAN:  FINDINGS:    CHEST CT PULMONARY ANGIO with IV Contrast:  FINDINGS:  MEDICATIONS  (STANDING):  aspirin enteric coated 81milliGRAM(s) Oral daily  finasteride 5milliGRAM(s) Oral daily  tamsulosin 0.4milliGRAM(s) Oral at bedtime  simvastatin 20milliGRAM(s) Oral at bedtime  multivitamin 1Tablet(s) Oral daily  pantoprazole    Tablet 40milliGRAM(s) Oral before breakfast  lactulose Syrup 10Gram(s) Oral daily  ALBUTerol    0.083% 2.5milliGRAM(s) Nebulizer every 6 hours  amiodarone Infusion 0.5mG/Min IV Continuous <Continuous>  carvedilol 12.5milliGRAM(s) Oral every 12 hours  acetaminophen  Suppository. 650milliGRAM(s) Rectal three times a day  enoxaparin Injectable 40milliGRAM(s) SubCutaneous daily  meropenem IVPB 500milliGRAM(s) IV Intermittent every 8 hours  levoFLOXacin IVPB  IV Intermittent   levoFLOXacin IVPB 250milliGRAM(s) IV Intermittent every 24 hours  morphine Concentrate 2milliGRAM(s) Oral three times a day    MEDICATIONS  (PRN):  LORazepam   Injectable 0.5milliGRAM(s) IV Push two times a day PRN Agitation      FAMILY HISTORY:  No pertinent family history in first degree relatives      SOCIAL HISTORY:    CIGARETTES:    ALCOHOL:    REVIEW OF SYSTEMS:    CONSTITUTIONAL: No fever, weight loss, chills, shakes, or fatigue  EYES: No eye pain, visual disturbances, or discharge  ENMT:  No difficulty hearing, tinnitus, vertigo; No sinus or throat pain  NECK: No pain or stiffness  BREASTS: No pain, masses, or nipple discharge  RESPIRATORY: No cough, wheezing, hemoptysis, or shortness of breath  CARDIOVASCULAR: No chest pain, dyspnea, palpitations, dizziness, syncope, paroxysmal nocturnal dyspnea, orthopnea, or arm or leg swelling  GASTROINTESTINAL: No abdominal  or epigastric pain, nausea, vomiting, hematemesis, diarrhea, constipation, melena or bright red blood.  GENITOURINARY: No dysuria, nocturia, hematuria, or urinary incontinence  NEUROLOGICAL: No headaches, memory loss, slurred speech, limb weakness, loss of strength, numbness, or tremors  SKIN: No itching, burning, rashes, or lesions   LYMPH NODES: No enlarged glands  ENDOCRINE: No heat or cold intolerance, or hair loss  MUSCULOSKELETAL: No joint pain or swelling, muscle, back, or extremity pain  PSYCHIATRIC: No depression, anxiety, or difficulty sleeping  HEME/LYMPH: No easy bruising or bleeding gums  ALLERY AND IMMUNOLOGIC: No hives or rash.      Vital Signs Last 24 Hrs  T(C): 36.9, Max: 38 (06-17 @ 17:09)  T(F): 98.4, Max: 100.4 (06-17 @ 17:09)  HR: 88 (85 - 114)  BP: 90/65 (90/65 - 119/87)  BP(mean): --  RR: 18 (18 - 22)  SpO2: 96% (20% - 98%)    PHYSICAL EXAM:    GENERAL: In no apparent distress, well nourished, and hydrated.  HEAD:  Atraumatic, Normocephalic  EYES: EOMI, PERRLA, conjunctiva and sclera clear  ENMT: No tonsillar erythema, exudates, or enlargement; Moist mucous membranes, Good dentition, No lesions  NECK: Supple and normal thyroid.  No JVD or carotid bruit.  Carotid pulse is 2+ bilaterally.  HEART: Regular rate and rhythm; No murmurs, rubs, or gallops.  PULMONARY: Clear to auscultation and perfusion.  No rales, wheezing, or rhonchi bilaterally.  ABDOMEN: Soft, Nontender, Nondistended; Bowel sounds present  EXTREMITIES:  2+ Peripheral Pulses, No clubbing, cyanosis, or edema  LYMPH: No lymphadenopathy noted  NEUROLOGICAL: Grossly nonfocal          INTERPRETATION OF TELEMETRY:    ECG:    I&O's Detail  I & Os for 24h ending 18 Jun 2017 07:00  =============================================  IN:    amiodarone Infusion: 180 ml    Solution: 150 ml    Oral Fluid: 80 ml    Total IN: 410 ml  ---------------------------------------------  OUT:    Indwelling Catheter - Urethral: 400 ml    Total OUT: 400 ml  ---------------------------------------------  Total NET: 10 ml    I & Os for current day (as of 18 Jun 2017 15:01)  =============================================  IN:    Solution: 100 ml    Total IN: 100 ml  ---------------------------------------------  OUT:    Total OUT: 0 ml  ---------------------------------------------  Total NET: 100 ml      LABS:                        11.7   14.7  )-----------( 258      ( 17 Jun 2017 07:14 )             34.8     06-17    139  |  105  |  26<H>  ----------------------------<  131<H>  3.7   |  24  |  1.20    Ca    8.5      17 Jun 2017 07:14              BNP  I&O's Detail  I & Os for 24h ending 18 Jun 2017 07:00  =============================================  IN:    amiodarone Infusion: 180 ml    Solution: 150 ml    Oral Fluid: 80 ml    Total IN: 410 ml  ---------------------------------------------  OUT:    Indwelling Catheter - Urethral: 400 ml    Total OUT: 400 ml  ---------------------------------------------  Total NET: 10 ml    I & Os for current day (as of 18 Jun 2017 15:01)  =============================================  IN:    Solution: 100 ml    Total IN: 100 ml  ---------------------------------------------  OUT:    Total OUT: 0 ml  ---------------------------------------------  Total NET: 100 ml    Daily     Daily     RADIOLOGY & ADDITIONAL STUDIES:

## 2017-06-18 NOTE — PROGRESS NOTE ADULT - SUBJECTIVE AND OBJECTIVE BOX
HPI:  ? Chief Complaint: The patient is a 84y Male complaining of urinary catheter complications.  ? HPI Objective Statement: Pertinent PMH/PSH/FHx/SHx and Review of Systems contained within:  Patient with history of HTN, HLD, CVA advanced dementia, and indwelling strickland cath (since CVA) presents to the ED for generalized weakness and abdominal pain.  Patient was complaining to his sons about wanting to have a bowel movement, but no diarrhea or melena noted.  Son noted that he was breathing differently yesterday, but has not been coughing. (10 Khanh 2017 14:55)      Allergies    morphine (Other)    Intolerances        MEDICATIONS  (STANDING):  aspirin enteric coated 81milliGRAM(s) Oral daily  finasteride 5milliGRAM(s) Oral daily  tamsulosin 0.4milliGRAM(s) Oral at bedtime  simvastatin 20milliGRAM(s) Oral at bedtime  multivitamin 1Tablet(s) Oral daily  pantoprazole    Tablet 40milliGRAM(s) Oral before breakfast  lactulose Syrup 10Gram(s) Oral daily  ALBUTerol    0.083% 2.5milliGRAM(s) Nebulizer every 6 hours  amiodarone Infusion 0.5mG/Min IV Continuous <Continuous>  carvedilol 12.5milliGRAM(s) Oral every 12 hours  acetaminophen  Suppository. 650milliGRAM(s) Rectal three times a day  enoxaparin Injectable 40milliGRAM(s) SubCutaneous daily  meropenem IVPB 500milliGRAM(s) IV Intermittent every 8 hours  levoFLOXacin IVPB  IV Intermittent   levoFLOXacin IVPB 250milliGRAM(s) IV Intermittent every 24 hours  morphine Concentrate 2milliGRAM(s) Oral three times a day    MEDICATIONS  (PRN):  LORazepam   Injectable 0.5milliGRAM(s) IV Push two times a day PRN Agitation      REVIEW OF SYSTEMS:    CONSTITUTIONAL: No fever, chills, weight loss, or fatigue  HEENT: No sore throat, runny nose, ear ache  RESPIRATORY: No cough, wheezing, No shortness of breath  CARDIOVASCULAR: No chest pain, palpitations, dizziness  GASTROINTESTINAL: No abdominal pain. No nausea, vomiting, diarrhea  GENITOURINARY: No dysuria, increase frequency, hematuria, or incontinence  NEUROLOGICAL: No headaches, memory loss, loss of strength, numbness, or tremors, no weakness  EXTREMITY: No pedal edema BLE  SKIN: No itching, burning, rashes, or lesions     VITAL SIGNS:  T(C): 36.9, Max: 38 (06-17 @ 17:09)  T(F): 98.4, Max: 100.4 (06-17 @ 17:09)  HR: 88 (85 - 114)  BP: 90/65 (90/65 - 119/87)  RR: 18 (18 - 22)  SpO2: 96% (20% - 98%)  Wt(kg): --    PHYSICAL EXAM:    GENERAL: not in any distress  HEENT: Neck is supple, normocephalic, atraumatic   CHEST/LUNG: Clear to percussion bilaterally; No rales, rhonchi, wheezing  HEART: Regular rate and rhythm; No murmurs, rubs, or gallops  ABDOMEN: Soft, Nontender, Nondistended; Bowel sounds present, no rebound   EXTREMITIES:  2+ Peripheral Pulses, No clubbing, cyanosis, or edema  GENITOURINARY:   SKIN: No rashes or lesions  BACK: no pressor sore   NERVOUS SYSTEM:  Alert & Oriented X3, Good concentration  PSYCH: normal affect     LABS:                         11.7   14.7  )-----------( 258      ( 17 Jun 2017 07:14 )             34.8     06-17    139  |  105  |  26<H>  ----------------------------<  131<H>  3.7   |  24  |  1.20    Ca    8.5      17 Jun 2017 07:14                          Vancomycin Level, Trough: 14.9 ug/mL (06-17 @ 07:14)                Legionella Antigen, Urine: Negative (06-17 @ 10:11)        Radiology: : The patient is a 84y Male complaining of urinary catheter complications.  Patient with history of HTN, HLD, CVA advanced dementia, and indwelling strickland cath (since CVA) presents to the ED for generalized weakness and abdominal pain.  Patient was complaining to his sons about wanting to have a bowel movement, but no diarrhea or melena noted.  Son noted that he was breathing differently yesterday, but has not been coughing. (10  more breathing issues here  congested   dnr/molst form reviewed with nurse yesterday   Allergies    morphine (Other)    Intolerances        MEDICATIONS  (STANDING):  aspirin enteric coated 81milliGRAM(s) Oral daily  finasteride 5milliGRAM(s) Oral daily  tamsulosin 0.4milliGRAM(s) Oral at bedtime  simvastatin 20milliGRAM(s) Oral at bedtime  multivitamin 1Tablet(s) Oral daily  pantoprazole    Tablet 40milliGRAM(s) Oral before breakfast  lactulose Syrup 10Gram(s) Oral daily  ALBUTerol    0.083% 2.5milliGRAM(s) Nebulizer every 6 hours  amiodarone Infusion 0.5mG/Min IV Continuous <Continuous>  carvedilol 12.5milliGRAM(s) Oral every 12 hours  acetaminophen  Suppository. 650milliGRAM(s) Rectal three times a day  enoxaparin Injectable 40milliGRAM(s) SubCutaneous daily  meropenem IVPB 500milliGRAM(s) IV Intermittent every 8 hours  levoFLOXacin IVPB  IV Intermittent   levoFLOXacin IVPB 250milliGRAM(s) IV Intermittent every 24 hours  morphine Concentrate 2milliGRAM(s) Oral three times a day    MEDICATIONS  (PRN):  LORazepam   Injectable 0.5milliGRAM(s) IV Push two times a day PRN Agitation      REVIEW OF SYSTEMS:  unable to obtain VITAL SIGNS:  T(C): 36.9, Max: 38 (06-17 @ 17:09)  T(F): 98.4, Max: 100.4 (06-17 @ 17:09)  HR: 88 (85 - 114)  BP: 90/65 (90/65 - 119/87)  RR: 18 (18 - 22)  SpO2: 96% (20% - 98%)  Wt(kg): --    PHYSICAL EXAM:    GENERAL: not in any distress  more obtunded  HEENT: Neck is supple, normocephalic, atraumatic   CHEST/LUNG: rhonchi bilaterally; gurgles throat   HEART: Regular rate and rhythm; No murmurs, rubs, or gallops  ABDOMEN: Soft, Nontender, Nondistended; Bowel sounds present, no rebound   EXTREMITIES:  2+ Peripheral Pulses, No clubbing, cyanosis, or edema  GENITOURINARY: NEGATIVE ; strickland plus   SKIN: No rashes or lesions  BACK: no pressor sore   NERVOUS SYSTEM:  obtunded    LABS:                         11.7   14.7  )-----------( 258      ( 17 Jun 2017 07:14 )             34.8     06-17    139  |  105  |  26<H>  ----------------------------<  131<H>  3.7   |  24  |  1.20    Ca    8.5      17 Jun 2017 07:14                          Vancomycin Level, Trough: 14.9 ug/mL (06-17 @ 07:14)                Legionella Antigen, Urine: Negative (06-17 @ 10:11)        Radiology:

## 2017-06-19 NOTE — PROGRESS NOTE ADULT - ASSESSMENT
community acquired pneumonia  resp distress  Altered Mental Status   dnr  continue supportive care  for hospice noted   continue antibiotics for now

## 2017-06-19 NOTE — PROGRESS NOTE ADULT - SUBJECTIVE AND OBJECTIVE BOX
Patient is a 84y old  Male who presents with a chief complaint of     INTERVAL HPI/OVERNIGHT EVENTS:    events noted  CXR clearing noted  less sob  moaning- no bvioius source of pain- continue morhine    MEDICATIONS  (STANDING):  aspirin enteric coated 81milliGRAM(s) Oral daily  finasteride 5milliGRAM(s) Oral daily  tamsulosin 0.4milliGRAM(s) Oral at bedtime  simvastatin 20milliGRAM(s) Oral at bedtime  multivitamin 1Tablet(s) Oral daily  pantoprazole    Tablet 40milliGRAM(s) Oral before breakfast  lactulose Syrup 10Gram(s) Oral daily  ALBUTerol    0.083% 2.5milliGRAM(s) Nebulizer every 6 hours  enoxaparin Injectable 40milliGRAM(s) SubCutaneous daily  meropenem IVPB 500milliGRAM(s) IV Intermittent every 8 hours  levoFLOXacin IVPB  IV Intermittent   levoFLOXacin IVPB 250milliGRAM(s) IV Intermittent every 24 hours  amiodarone    Tablet 200milliGRAM(s) Oral daily  LORazepam   Injectable 0.5milliGRAM(s) IV Push two times a day  morphine Concentrate 2milliGRAM(s) SubLingual every 6 hours  scopolamine   Patch 1.5milliGRAM(s) Transdermal every 3 days  carvedilol 3.125milliGRAM(s) Oral every 12 hours  furosemide   Injectable 20milliGRAM(s) IV Push daily    MEDICATIONS  (PRN):        REVIEW OF SYSTEMS:  CONSTITUTIONAL: No fever, weight loss, or fatigue  EYES: No eye pain, visual disturbances, or discharge  ENMT:  No difficulty hearing, tinnitus, vertigo; No sinus or throat pain  NECK: No pain or stiffness  RESPIRATORY: No cough, wheezing, chills or hemoptysis; No shortness of breath  CARDIOVASCULAR: No chest pain, palpitations, dizziness, or leg swelling  GASTROINTESTINAL: No abdominal or epigastric pain. No nausea, vomiting, or hematemesis; No diarrhea or constipation. No melena or hematochezia.  GENITOURINARY: No dysuria, frequency, hematuria, or incontinence  NEUROLOGICAL: No headaches, memory loss, loss of strength, numbness, or tremors  SKIN: No itching, burning, rashes, or lesions      Vital Signs Last 24 Hrs  T(C): 37.1, Max: 37.1 (06-20 @ 05:15)  T(F): 98.8, Max: 98.8 (06-20 @ 05:15)  HR: 91 (80 - 114)  BP: 98/57 (98/57 - 113/65)  BP(mean): --  RR: 20 (20 - 20)  SpO2: 96% (78% - 98%)    PHYSICAL EXAM:  GENERAL: NAD, well-groomed, well-developed  HEAD:  Atraumatic, Normocephalic  EYES: EOMI, PERRLA, conjunctiva and sclera clear  ENMT: No tonsillar erythema, exudates, or enlargement; Moist mucous membranes   NECK: Supple, No JVD   NERVOUS SYSTEM:  Alert & Oriented X3, Good concentration; Motor Strength 5/5 B/L upper and lower extremities; DTRs 2+ intact and symmetric  CHEST/LUNG: Clear to percussion bilaterally; No rales, rhonchi, wheezing, or rubs  HEART: Regular rate and rhythm; No murmurs, rubs, or gallops  ABDOMEN: Soft, Nontender, Nondistended; Bowel sounds present, peg in situ  EXTREMITIES:  2+ Peripheral Pulses, No clubbing, cyanosis, or edema  LYMPH: No lymphadenopathy noted  SKIN: No rashes or lesions    LABS:              CAPILLARY BLOOD GLUCOSE      RADIOLOGY & ADDITIONAL TESTS:    Imaging Personally Reviewed:  [ ] YES  [ ] NO    Consultant(s) Notes Reviewed:  [ ] YES  [ ] NO    Care Discussed with Consultants/Other Providers [ ] YES  [ ] NO

## 2017-06-19 NOTE — PROGRESS NOTE ADULT - ASSESSMENT
Continue present therapy  further care per family wishes- palliative/comfort measures  continue standing morphine dose

## 2017-06-19 NOTE — PROGRESS NOTE ADULT - SUBJECTIVE AND OBJECTIVE BOX
The patient is a 84y Male complaining of urinary catheter complications.  Patient with history of HTN, HLD, CVA advanced dementia, and indwelling strickland cath (since CVA) presents to the ED for generalized weakness and abdominal pain.  Patient was complaining to his sons about wanting to have a bowel movement, but no diarrhea or melena noted.  Son noted that he was breathing differently yesterday, but has not been coughing. (10 Khanh 2017 14:55  ongoing worsening of mental status   congested still   obtunded    Allergies    morphine (Other)    Intolerances        MEDICATIONS  (STANDING):  aspirin enteric coated 81milliGRAM(s) Oral daily  finasteride 5milliGRAM(s) Oral daily  tamsulosin 0.4milliGRAM(s) Oral at bedtime  simvastatin 20milliGRAM(s) Oral at bedtime  multivitamin 1Tablet(s) Oral daily  pantoprazole    Tablet 40milliGRAM(s) Oral before breakfast  lactulose Syrup 10Gram(s) Oral daily  ALBUTerol    0.083% 2.5milliGRAM(s) Nebulizer every 6 hours  enoxaparin Injectable 40milliGRAM(s) SubCutaneous daily  meropenem IVPB 500milliGRAM(s) IV Intermittent every 8 hours  levoFLOXacin IVPB  IV Intermittent   levoFLOXacin IVPB 250milliGRAM(s) IV Intermittent every 24 hours  amiodarone    Tablet 200milliGRAM(s) Oral daily  LORazepam   Injectable 0.5milliGRAM(s) IV Push two times a day  morphine Concentrate 2milliGRAM(s) SubLingual every 6 hours  carvedilol 6.25milliGRAM(s) Oral every 12 hours  scopolamine   Patch 1.5milliGRAM(s) Transdermal every 3 days    MEDICATIONS  (PRN):      REVIEW OF SYSTEMS:  unable to obtain   VITAL SIGNS:  T(C): 36.7, Max: 37.7 (06-18 @ 17:03)  T(F): 98, Max: 99.9 (06-18 @ 17:03)  HR: 80 (74 - 93)  BP: 107/62 (84/44 - 114/68)  RR: 20 (16 - 20)  SpO2: 98% (93% - 99%)  Wt(kg): --    PHYSICAL EXAM:    GENERAL: not in any distress  unresponsive   in nad   has been getting morphine   HEENT: Neck is supple, normocephalic, atraumatic   mouth open and dry   CHEST/LUNG:rhonchi  bilaterally; occasional rales , wheezing  HEART: Regular rate and rhythm; No murmurs, rubs, or gallops  ABDOMEN: Soft, Nontender, Nondistended; Bowel sounds present, no rebound   EXTREMITIES:  2+ Peripheral Pulses, No clubbing, cyanosis, or edema  GENITOURINARY: NEGATIVE   SKIN: No rashes or lesions  BACK: no pressor sore   NERVOUS SYSTEM:  unresponsive     LABS:                               Vancomycin Level, Trough: 14.9 ug/mL (06-17 @ 07:14)                Legionella Antigen, Urine: Negative (06-17 @ 10:11)        Radiology:

## 2017-06-19 NOTE — PROGRESS NOTE ADULT - SUBJECTIVE AND OBJECTIVE BOX
Patient is a 84y old  Male who presents with a chief complaint of       PAST MEDICAL & SURGICAL HISTORY:  BPH (benign prostatic hyperplasia)  Hypercholesteremia  Hypertension  No significant past surgical history      Summary of admission HPI: SOB afib                PREVIOUS DIAGNOSTIC TESTING:      ECHO  FINDINGS:    STRESS  FINDINGS:    CATHETERIZATION  FINDINGS:    ELECTROPHYSIOLOGY STUDY  FINDINGS:    CAROTID ULTRASOUND:  FINDINGS    VENOUS DUPLEX SCAN:  FINDINGS:    CHEST CT PULMONARY ANGIO with IV Contrast:  FINDINGS:  MEDICATIONS  (STANDING):  aspirin enteric coated 81milliGRAM(s) Oral daily  finasteride 5milliGRAM(s) Oral daily  tamsulosin 0.4milliGRAM(s) Oral at bedtime  simvastatin 20milliGRAM(s) Oral at bedtime  multivitamin 1Tablet(s) Oral daily  pantoprazole    Tablet 40milliGRAM(s) Oral before breakfast  lactulose Syrup 10Gram(s) Oral daily  ALBUTerol    0.083% 2.5milliGRAM(s) Nebulizer every 6 hours  enoxaparin Injectable 40milliGRAM(s) SubCutaneous daily  meropenem IVPB 500milliGRAM(s) IV Intermittent every 8 hours  levoFLOXacin IVPB  IV Intermittent   levoFLOXacin IVPB 250milliGRAM(s) IV Intermittent every 24 hours  amiodarone    Tablet 200milliGRAM(s) Oral daily  LORazepam   Injectable 0.5milliGRAM(s) IV Push two times a day  morphine Concentrate 2milliGRAM(s) SubLingual every 6 hours  scopolamine   Patch 1.5milliGRAM(s) Transdermal every 3 days  carvedilol 3.125milliGRAM(s) Oral every 12 hours  furosemide   Injectable 20milliGRAM(s) IV Push daily    MEDICATIONS  (PRN):      FAMILY HISTORY:  No pertinent family history in first degree relatives      SOCIAL HISTORY:    CIGARETTES:    ALCOHOL:    REVIEW OF SYSTEMS:    CONSTITUTIONAL: No fever, weight loss, chills, shakes, or fatigue  EYES: No eye pain, visual disturbances, or discharge  ENMT:  No difficulty hearing, tinnitus, vertigo; No sinus or throat pain  NECK: No pain or stiffness  BREASTS: No pain, masses, or nipple discharge  RESPIRATORY: No cough, wheezing, hemoptysis, or shortness of breath  CARDIOVASCULAR: No chest pain, dyspnea, palpitations, dizziness, syncope, paroxysmal nocturnal dyspnea, orthopnea, or arm or leg swelling  GASTROINTESTINAL: No abdominal  or epigastric pain, nausea, vomiting, hematemesis, diarrhea, constipation, melena or bright red blood.  GENITOURINARY: No dysuria, nocturia, hematuria, or urinary incontinence  NEUROLOGICAL: No headaches, memory loss, slurred speech, limb weakness, loss of strength, numbness, or tremors  SKIN: No itching, burning, rashes, or lesions   LYMPH NODES: No enlarged glands  ENDOCRINE: No heat or cold intolerance, or hair loss  MUSCULOSKELETAL: No joint pain or swelling, muscle, back, or extremity pain  PSYCHIATRIC: No depression, anxiety, or difficulty sleeping  HEME/LYMPH: No easy bruising or bleeding gums  ALLERY AND IMMUNOLOGIC: No hives or rash.      Vital Signs Last 24 Hrs  T(C): 36.7, Max: 37 (06-18 @ 23:38)  T(F): 98, Max: 98.6 (06-18 @ 23:38)  HR: 95 (76 - 96)  BP: 112/72 (84/44 - 112/72)  BP(mean): --  RR: 20 (16 - 20)  SpO2: 96% (96% - 98%)    PHYSICAL EXAM:    GENERAL: In no apparent distress, well nourished, and hydrated.  HEAD:  Atraumatic, Normocephalic  EYES: EOMI, PERRLA, conjunctiva and sclera clear  ENMT: No tonsillar erythema, exudates, or enlargement; Moist mucous membranes, Good dentition, No lesions  NECK: Supple and normal thyroid.  No JVD or carotid bruit.  Carotid pulse is 2+ bilaterally.  HEART: Regular rate and rhythm; No murmurs, rubs, or gallops.  PULMONARY: Clear to auscultation and perfusion.  No rales, wheezing, or rhonchi bilaterally.  ABDOMEN: Soft, Nontender, Nondistended; Bowel sounds present  EXTREMITIES:  2+ Peripheral Pulses, No clubbing, cyanosis, or edema  LYMPH: No lymphadenopathy noted  NEUROLOGICAL: Grossly nonfocal          INTERPRETATION OF TELEMETRY:    ECG:    I&O's Detail  I & Os for 24h ending 19 Jun 2017 07:00  =============================================  IN:    Jevity: 820 ml    Solution: 200 ml    Total IN: 1020 ml  ---------------------------------------------  OUT:    Indwelling Catheter - Urethral: 550 ml    Total OUT: 550 ml  ---------------------------------------------  Total NET: 470 ml    I & Os for current day (as of 19 Jun 2017 21:27)  =============================================  IN:    Jevity: 720 ml    Solution: 150 ml    Total IN: 870 ml  ---------------------------------------------  OUT:    Indwelling Catheter - Urethral: 350 ml    Total OUT: 350 ml  ---------------------------------------------  Total NET: 520 ml      LABS:                  BNP  I&O's Detail  I & Os for 24h ending 19 Jun 2017 07:00  =============================================  IN:    Jevity: 820 ml    Solution: 200 ml    Total IN: 1020 ml  ---------------------------------------------  OUT:    Indwelling Catheter - Urethral: 550 ml    Total OUT: 550 ml  ---------------------------------------------  Total NET: 470 ml    I & Os for current day (as of 19 Jun 2017 21:27)  =============================================  IN:    Jevity: 720 ml    Solution: 150 ml    Total IN: 870 ml  ---------------------------------------------  OUT:    Indwelling Catheter - Urethral: 350 ml    Total OUT: 350 ml  ---------------------------------------------  Total NET: 520 ml    Daily     Daily     RADIOLOGY & ADDITIONAL STUDIES:

## 2017-06-20 NOTE — PROGRESS NOTE ADULT - SUBJECTIVE AND OBJECTIVE BOX
Patient is a 84y old  Male who presents with a chief complaint of     INTERVAL HPI/OVERNIGHT EVENTS:  clinically unchanged  responsive  daughter at bedside  MEDICATIONS  (STANDING):  aspirin enteric coated 81milliGRAM(s) Oral daily  finasteride 5milliGRAM(s) Oral daily  tamsulosin 0.4milliGRAM(s) Oral at bedtime  simvastatin 20milliGRAM(s) Oral at bedtime  multivitamin 1Tablet(s) Oral daily  pantoprazole    Tablet 40milliGRAM(s) Oral before breakfast  lactulose Syrup 10Gram(s) Oral daily  ALBUTerol    0.083% 2.5milliGRAM(s) Nebulizer every 6 hours  enoxaparin Injectable 40milliGRAM(s) SubCutaneous daily  meropenem IVPB 500milliGRAM(s) IV Intermittent every 8 hours  levoFLOXacin IVPB  IV Intermittent   levoFLOXacin IVPB 250milliGRAM(s) IV Intermittent every 24 hours  amiodarone    Tablet 200milliGRAM(s) Oral daily  LORazepam   Injectable 0.5milliGRAM(s) IV Push two times a day  morphine Concentrate 2milliGRAM(s) SubLingual every 6 hours  scopolamine   Patch 1.5milliGRAM(s) Transdermal every 3 days  carvedilol 3.125milliGRAM(s) Oral every 12 hours  furosemide   Injectable 20milliGRAM(s) IV Push daily    MEDICATIONS  (PRN):        REVIEW OF SYSTEMS:  CONSTITUTIONAL: No fever, weight loss, or fatigue  EYES: No eye pain, visual disturbances, or discharge  ENMT:  No difficulty hearing, tinnitus, vertigo; No sinus or throat pain  NECK: No pain or stiffness  RESPIRATORY: No cough, wheezing, chills or hemoptysis; No shortness of breath  CARDIOVASCULAR: No chest pain, palpitations, dizziness, or leg swelling  GASTROINTESTINAL: No abdominal or epigastric pain. No nausea, vomiting, or hematemesis; No diarrhea or constipation. No melena or hematochezia.  GENITOURINARY: No dysuria, frequency, hematuria, or incontinence  NEUROLOGICAL: No headaches, memory loss, loss of strength, numbness, or tremors  SKIN: No itching, burning, rashes, or lesions      Vital Signs Last 24 Hrs  T(C): 37.1, Max: 37.1 (06-20 @ 05:15)  T(F): 98.8, Max: 98.8 (06-20 @ 05:15)  HR: 91 (80 - 114)  BP: 98/57 (98/57 - 113/65)  BP(mean): --  RR: 20 (20 - 20)  SpO2: 96% (78% - 98%)    PHYSICAL EXAM:  GENERAL: NAD, well-groomed, well-developed  HEAD:  Atraumatic, Normocephalic  EYES: EOMI, PERRLA, conjunctiva and sclera clear  ENMT: No tonsillar erythema, exudates, or enlargement; Moist mucous membranes   NECK: Supple, No JVD   NERVOUS SYSTEM:  Alert & Oriented X3, Good concentration; Motor Strength 5/5 B/L upper and lower extremities; DTRs 2+ intact and symmetric  CHEST/LUNG: Clear to percussion bilaterally; bilateral rales, rhonchi, wheezing, no rubs  HEART: Regular rate and rhythm; No murmurs, rubs, or gallops  ABDOMEN: Soft, Nontender, Nondistended; Bowel sounds present  EXTREMITIES:  2+ Peripheral Pulses, No clubbing, cyanosis, or edema  LYMPH: No lymphadenopathy noted  SKIN: No rashes or lesions    LABS:              CAPILLARY BLOOD GLUCOSE      RADIOLOGY & ADDITIONAL TESTS:    Imaging Personally Reviewed:  [ ] YES  [ ] NO    Consultant(s) Notes Reviewed:  [ ] YES  [ ] NO    Care Discussed with Consultants/Other Providers [ ] YES  [ ] NO

## 2017-06-20 NOTE — PROGRESS NOTE ADULT - SUBJECTIVE AND OBJECTIVE BOX
Patient is a 84y old  Male who presents with a chief complaint of       PAST MEDICAL & SURGICAL HISTORY:  BPH (benign prostatic hyperplasia)  Hypercholesteremia  Hypertension  No significant past surgical history      Summary of admission HPI:SOB  SOB              PREVIOUS DIAGNOSTIC TESTING:      ECHO  FINDINGS:    STRESS  FINDINGS:    CATHETERIZATION  FINDINGS:    ELECTROPHYSIOLOGY STUDY  FINDINGS:    CAROTID ULTRASOUND:  FINDINGS    VENOUS DUPLEX SCAN:  FINDINGS:    CHEST CT PULMONARY ANGIO with IV Contrast:  FINDINGS:  MEDICATIONS  (STANDING):  aspirin enteric coated 81milliGRAM(s) Oral daily  finasteride 5milliGRAM(s) Oral daily  tamsulosin 0.4milliGRAM(s) Oral at bedtime  simvastatin 20milliGRAM(s) Oral at bedtime  multivitamin 1Tablet(s) Oral daily  pantoprazole    Tablet 40milliGRAM(s) Oral before breakfast  lactulose Syrup 10Gram(s) Oral daily  ALBUTerol    0.083% 2.5milliGRAM(s) Nebulizer every 6 hours  enoxaparin Injectable 40milliGRAM(s) SubCutaneous daily  meropenem IVPB 500milliGRAM(s) IV Intermittent every 8 hours  levoFLOXacin IVPB  IV Intermittent   levoFLOXacin IVPB 250milliGRAM(s) IV Intermittent every 24 hours  amiodarone    Tablet 200milliGRAM(s) Oral daily  LORazepam   Injectable 0.5milliGRAM(s) IV Push two times a day  morphine Concentrate 2milliGRAM(s) SubLingual every 6 hours  scopolamine   Patch 1.5milliGRAM(s) Transdermal every 3 days  carvedilol 3.125milliGRAM(s) Oral every 12 hours  furosemide   Injectable 20milliGRAM(s) IV Push daily    MEDICATIONS  (PRN):      FAMILY HISTORY:  No pertinent family history in first degree relatives      SOCIAL HISTORY:    CIGARETTES:    ALCOHOL:    REVIEW OF SYSTEMS:    CONSTITUTIONAL: No fever, weight loss, chills, shakes, or fatigue  EYES: No eye pain, visual disturbances, or discharge  ENMT:  No difficulty hearing, tinnitus, vertigo; No sinus or throat pain  NECK: No pain or stiffness  BREASTS: No pain, masses, or nipple discharge  RESPIRATORY: No cough, wheezing, hemoptysis, or shortness of breath  CARDIOVASCULAR: No chest pain, dyspnea, palpitations, dizziness, syncope, paroxysmal nocturnal dyspnea, orthopnea, or arm or leg swelling  GASTROINTESTINAL: No abdominal  or epigastric pain, nausea, vomiting, hematemesis, diarrhea, constipation, melena or bright red blood.  GENITOURINARY: No dysuria, nocturia, hematuria, or urinary incontinence  NEUROLOGICAL: No headaches, memory loss, slurred speech, limb weakness, loss of strength, numbness, or tremors  SKIN: No itching, burning, rashes, or lesions   LYMPH NODES: No enlarged glands  ENDOCRINE: No heat or cold intolerance, or hair loss  MUSCULOSKELETAL: No joint pain or swelling, muscle, back, or extremity pain  PSYCHIATRIC: No depression, anxiety, or difficulty sleeping  HEME/LYMPH: No easy bruising or bleeding gums  ALLERY AND IMMUNOLOGIC: No hives or rash.      Vital Signs Last 24 Hrs  T(C): 37.7, Max: 37.7 (06-20 @ 16:14)  T(F): 99.8, Max: 99.8 (06-20 @ 16:14)  HR: 88 (88 - 116)  BP: 109/60 (98/57 - 113/65)  BP(mean): --  RR: 18 (18 - 20)  SpO2: 92% (78% - 98%)    PHYSICAL EXAM:    GENERAL: In no apparent distress, well nourished, and hydrated.  HEAD:  Atraumatic, Normocephalic  EYES: EOMI, PERRLA, conjunctiva and sclera clear  ENMT: No tonsillar erythema, exudates, or enlargement; Moist mucous membranes, Good dentition, No lesions  NECK: Supple and normal thyroid.  No JVD or carotid bruit.  Carotid pulse is 2+ bilaterally.  HEART: Regular rate and rhythm; No murmurs, rubs, or gallops.  PULMONARY: Clear to auscultation and perfusion.  No rales, wheezing, or rhonchi bilaterally.  ABDOMEN: Soft, Nontender, Nondistended; Bowel sounds present  EXTREMITIES:  2+ Peripheral Pulses, No clubbing, cyanosis, or edema  LYMPH: No lymphadenopathy noted  NEUROLOGICAL: Grossly nonfocal          INTERPRETATION OF TELEMETRY:    ECG:    I&O's Detail  I & Os for 24h ending 20 Jun 2017 07:00  =============================================  IN:    Jevity: 1440 ml    Solution: 250 ml    Total IN: 1690 ml  ---------------------------------------------  OUT:    Indwelling Catheter - Urethral: 580 ml    Total OUT: 580 ml  ---------------------------------------------  Total NET: 1110 ml    I & Os for current day (as of 20 Jun 2017 20:31)  =============================================  IN:    Jevity: 720 ml    Solution: 150 ml    Total IN: 870 ml  ---------------------------------------------  OUT:    Indwelling Catheter - Urethral: 550 ml    Total OUT: 550 ml  ---------------------------------------------  Total NET: 320 ml      LABS:                        11.2   16.0  )-----------( 253      ( 20 Jun 2017 10:41 )             33.2     06-20    146<H>  |  110<H>  |  61<H>  ----------------------------<  174<H>  3.7   |  26  |  1.44<H>    Ca    9.1      20 Jun 2017 10:41              BNP  I&O's Detail  I & Os for 24h ending 20 Jun 2017 07:00  =============================================  IN:    Jevity: 1440 ml    Solution: 250 ml    Total IN: 1690 ml  ---------------------------------------------  OUT:    Indwelling Catheter - Urethral: 580 ml    Total OUT: 580 ml  ---------------------------------------------  Total NET: 1110 ml    I & Os for current day (as of 20 Jun 2017 20:31)  =============================================  IN:    Jevity: 720 ml    Solution: 150 ml    Total IN: 870 ml  ---------------------------------------------  OUT:    Indwelling Catheter - Urethral: 550 ml    Total OUT: 550 ml  ---------------------------------------------  Total NET: 320 ml    Daily     Daily     RADIOLOGY & ADDITIONAL STUDIES:

## 2017-06-20 NOTE — PROGRESS NOTE ADULT - SUBJECTIVE AND OBJECTIVE BOX
HPI:  ? Chief Complaint: The patient is a 84y Male complaining of urinary catheter complications.  ? HPI Objective Statement: Pertinent PMH/PSH/FHx/SHx and Review of Systems contained within:  Patient with history of HTN, HLD, CVA advanced dementia, and indwelling strickland cath (since CVA) presents to the ED for generalized weakness and abdominal pain.  Patient was complaining to his sons about wanting to have a bowel movement, but no diarrhea or melena noted.  Son noted that he was breathing differently yesterday, but has not been coughing. (10 Khanh 2017 14:55)  Vital Signs Last 24 Hrs  T(C): 37.7, Max: 37.7 (06-20 @ 16:14)  T(F): 99.8, Max: 99.8 (06-20 @ 16:14)  HR: 88 (80 - 116)  BP: 109/60 (98/57 - 113/65)  BP(mean): --  RR: 18 (18 - 20)  SpO2: 92% (78% - 98%)  a/p Overall prognosis is poor   suggest Hospice '  Family agrees

## 2017-06-20 NOTE — PROGRESS NOTE ADULT - SUBJECTIVE AND OBJECTIVE BOX
The patient is a 84y Male complaining of urinary catheter complications.  Patient with history of HTN, HLD, CVA advanced dementia, and indwelling strickland cath (since CVA) presents to the ED for generalized weakness and abdominal pain.  Patient was complaining to his sons about wanting to have a bowel movement, but no diarrhea or melena noted.  Son noted that he was breathing differently yesterday, but has not been coughing. (10 Khanh 2017 14:55  ongoing worsening of mental status   congested still   obtunded    HPI:  ? Chief Complaint: The patient is a 84y Male complaining of urinary catheter complications.  ? HPI Objective Statement: Pertinent PMH/PSH/FHx/SHx and Review of Systems contained within:  Patient with history of HTN, HLD, CVA advanced dementia, and indwelling strickland cath (since CVA) presents to the ED for generalized weakness and abdominal pain.  Patient was complaining to his sons about wanting to have a bowel movement, but no diarrhea or melena noted.  Son noted that he was breathing differently yesterday, but has not been coughing. (10 Khanh 2017 14:55)      Allergies    morphine (Other)    Intolerances        MEDICATIONS  (STANDING):  aspirin enteric coated 81milliGRAM(s) Oral daily  finasteride 5milliGRAM(s) Oral daily  tamsulosin 0.4milliGRAM(s) Oral at bedtime  simvastatin 20milliGRAM(s) Oral at bedtime  multivitamin 1Tablet(s) Oral daily  pantoprazole    Tablet 40milliGRAM(s) Oral before breakfast  lactulose Syrup 10Gram(s) Oral daily  ALBUTerol    0.083% 2.5milliGRAM(s) Nebulizer every 6 hours  enoxaparin Injectable 40milliGRAM(s) SubCutaneous daily  meropenem IVPB 500milliGRAM(s) IV Intermittent every 8 hours  levoFLOXacin IVPB  IV Intermittent   levoFLOXacin IVPB 250milliGRAM(s) IV Intermittent every 24 hours  amiodarone    Tablet 200milliGRAM(s) Oral daily  LORazepam   Injectable 0.5milliGRAM(s) IV Push two times a day  morphine Concentrate 2milliGRAM(s) SubLingual every 6 hours  scopolamine   Patch 1.5milliGRAM(s) Transdermal every 3 days  carvedilol 3.125milliGRAM(s) Oral every 12 hours  furosemide   Injectable 20milliGRAM(s) IV Push daily    MEDICATIONS  (PRN):      REVIEW OF SYSTEMS:    CONSTITUTIONAL: No fever, chills, weight loss, or fatigue  HEENT: No sore throat, runny nose, ear ache  RESPIRATORY: No cough, wheezing, No shortness of breath  CARDIOVASCULAR: No chest pain, palpitations, dizziness  GASTROINTESTINAL: No abdominal pain. No nausea, vomiting, diarrhea  GENITOURINARY: No dysuria, increase frequency, hematuria, or incontinence  NEUROLOGICAL: No headaches, memory loss, loss of strength, numbness, or tremors, no weakness  EXTREMITY: No pedal edema BLE  SKIN: No itching, burning, rashes, or lesions     VITAL SIGNS:  T(C): 37.1, Max: 37.1 (06-20 @ 05:15)  T(F): 98.8, Max: 98.8 (06-20 @ 05:15)  HR: 89 (80 - 114)  BP: 102/62 (98/57 - 113/65)  RR: 20 (20 - 20)  SpO2: 96% (78% - 98%)  Wt(kg): --    PHYSICAL EXAM:    GENERAL: not in any distress  HEENT: Neck is supple, normocephalic, atraumatic   CHEST/LUNG: Clear to percussion bilaterally; No rales, rhonchi, wheezing  HEART: Regular rate and rhythm; No murmurs, rubs, or gallops  ABDOMEN: Soft, Nontender, Nondistended; Bowel sounds present, no rebound   EXTREMITIES:  2+ Peripheral Pulses, No clubbing, cyanosis, or edema  GENITOURINARY:   SKIN: No rashes or lesions  BACK: no pressor sore   NERVOUS SYSTEM:  Alert & Oriented X3, Good concentration  PSYCH: normal affect     LABS:                                             Legionella Antigen, Urine: Negative (06-17 @ 10:11)        Radiology: The patient is a 84y Male complaining of urinary catheter complications.  Patient with history of HTN, HLD, CVA advanced dementia, and indwelling strickland cath (since CVA) presents to the ED for generalized weakness and abdominal pain.  Patient was complaining to his sons about wanting to have a bowel movement, but no diarrhea or melena noted.  Son noted that he was breathing differently yesterday, but has not been coughing. (10 Khanh 2017 14:55  ongoing worsening of mental status   congested still   obtunded        Allergies    morphine (Other)    Intolerances        MEDICATIONS  (STANDING):  aspirin enteric coated 81milliGRAM(s) Oral daily  finasteride 5milliGRAM(s) Oral daily  tamsulosin 0.4milliGRAM(s) Oral at bedtime  simvastatin 20milliGRAM(s) Oral at bedtime  multivitamin 1Tablet(s) Oral daily  pantoprazole    Tablet 40milliGRAM(s) Oral before breakfast  lactulose Syrup 10Gram(s) Oral daily  ALBUTerol    0.083% 2.5milliGRAM(s) Nebulizer every 6 hours  enoxaparin Injectable 40milliGRAM(s) SubCutaneous daily  meropenem IVPB 500milliGRAM(s) IV Intermittent every 8 hours  levoFLOXacin IVPB  IV Intermittent   levoFLOXacin IVPB 250milliGRAM(s) IV Intermittent every 24 hours  amiodarone    Tablet 200milliGRAM(s) Oral daily  LORazepam   Injectable 0.5milliGRAM(s) IV Push two times a day  morphine Concentrate 2milliGRAM(s) SubLingual every 6 hours  scopolamine   Patch 1.5milliGRAM(s) Transdermal every 3 days  carvedilol 3.125milliGRAM(s) Oral every 12 hours  furosemide   Injectable 20milliGRAM(s) IV Push daily    MEDICATIONS  (PRN):      REVIEW OF SYSTEMS:  unable to obtain   T(C): 37.1, Max: 37.1 (06-20 @ 05:15)  T(F): 98.8, Max: 98.8 (06-20 @ 05:15)  HR: 89 (80 - 114)  BP: 102/62 (98/57 - 113/65)  RR: 20 (20 - 20)  SpO2: 96% (78% - 98%)  Wt(kg): --    PHYSICAL EXAM:  obtunded   mouth breathing   GENERAL: not in any distress  HEENT: Neck is supple, normocephalic, atraumatic   dry open mouth   CHEST/LUNG: Clear to percussion bilaterally; No rales, rhonchi, wheezing  HEART: Regular rate and rhythm; No murmurs, rubs, or gallops  ABDOMEN: Soft, Nontender, Nondistended; Bowel sounds present, no rebound   EXTREMITIES:  2+ Peripheral Pulses, No clubbing, cyanosis, or edema  GENITOURINARY: NEGATIVE   SKIN: No rashes or lesions  Impression: Diffuse CHF and pulmonary edema. Improvement in airspace   disease in upper lobes              DIONE VAZQUEZ M.D., ATTENDING RADIOLOGIST  This document has been electronically signed. Jun 19 2017 12:11PM          BACK: no pressor sore   NERVOUS SYSTEM:  unresponsive   hospice pending but on morphine                                           Legionella Antigen, Urine: Negative (06-17 @ 10:11)        Radiology:

## 2017-06-21 NOTE — PROGRESS NOTE ADULT - SUBJECTIVE AND OBJECTIVE BOX
The patient is a 84y Male complaining of urinary catheter complications.  Patient with history of HTN, HLD, CVA advanced dementia, and indwelling strickland cath (since CVA) presents to the ED for generalized weakness and abdominal pain.  Patient was complaining to his sons about wanting to have a bowel movement, but no diarrhea or melena noted.  Son noted that he was breathing differently yesterday, but has not been coughing. (10 Khanh 2017 14:55  ongoing worsening of mental status   congested still   obtunded  HPI:  ? Chief Complaint: The patient is a 84y Male complaining of urinary catheter complications.  ? HPI Objective Statement: Pertinent PMH/PSH/FHx/SHx and Review of Systems contained within:  Patient with history of HTN, HLD, CVA advanced dementia, and indwelling strickland cath (since CVA) presents to the ED for generalized weakness and abdominal pain.  Patient was complaining to his sons about wanting to have a bowel movement, but no diarrhea or melena noted.  Son noted that he was breathing differently yesterday, but has not been coughing. (10 Khanh 2017 14:55)      Allergies    morphine (Other)    Intolerances        MEDICATIONS  (STANDING):  aspirin enteric coated 81milliGRAM(s) Oral daily  finasteride 5milliGRAM(s) Oral daily  tamsulosin 0.4milliGRAM(s) Oral at bedtime  simvastatin 20milliGRAM(s) Oral at bedtime  multivitamin 1Tablet(s) Oral daily  lactulose Syrup 10Gram(s) Oral daily  ALBUTerol    0.083% 2.5milliGRAM(s) Nebulizer every 6 hours  enoxaparin Injectable 40milliGRAM(s) SubCutaneous daily  meropenem IVPB 500milliGRAM(s) IV Intermittent every 8 hours  levoFLOXacin IVPB  IV Intermittent   levoFLOXacin IVPB 250milliGRAM(s) IV Intermittent every 24 hours  amiodarone    Tablet 200milliGRAM(s) Oral daily  LORazepam   Injectable 0.5milliGRAM(s) IV Push two times a day  morphine Concentrate 2milliGRAM(s) SubLingual every 6 hours  scopolamine   Patch 1.5milliGRAM(s) Transdermal every 3 days  carvedilol 3.125milliGRAM(s) Oral every 12 hours  furosemide   Injectable 20milliGRAM(s) IV Push daily  dextrose 5% + sodium chloride 0.45%. 1000milliLiter(s) IV Continuous <Continuous>  pantoprazole   Suspension 40milliGRAM(s) Oral daily    MEDICATIONS  (PRN):      REVIEW OF SYSTEMS:    unable to obtain   VITAL SIGNS:  T(C): 37.3, Max: 37.7 (06-21 @ 05:02)  T(F): 99.2, Max: 99.8 (06-21 @ 05:02)  HR: 89 (80 - 108)  BP: 102/59 (97/67 - 126/68)  RR: 18 (18 - 22)  SpO2: 95% (94% - 98%)  Wt(kg): --    PHYSICAL EXAM:    GENERAL: not in any distress  HEENT: Neck is supple, normocephalic, atraumatic   CHEST/LUNG: Clear  bilaterally; No rales, rhonchi, wheezing  HEART: Regular rate and rhythm; No murmurs, rubs, or gallops  ABDOMEN: Soft, Nontender, Nondistended; Bowel sounds present, no rebound   EXTREMITIES:  2+ Peripheral Pulses, No clubbing, cyanosis, or edema  GENITOURINARY: strickland   SKIN: No rashes or lesions  BACK: no pressor sore   NERVOUS SYSTEM:  unresponsive   mouth open and dry       LABS:                         11.2   16.0  )-----------( 253      ( 20 Jun 2017 10:41 )             33.2     06-20    146<H>  |  110<H>  |  61<H>  ----------------------------<  174<H>  3.7   |  26  |  1.44<H>    Ca    9.1      20 Jun 2017 10:41                                        Legionella Antigen, Urine: Negative (06-17 @ 10:11)        Radiology:

## 2017-06-21 NOTE — PROGRESS NOTE ADULT - SUBJECTIVE AND OBJECTIVE BOX
Patient is a 84y old  Male who presents with a chief complaint of     INTERVAL HPI/OVERNIGHT EVENTS:    minimal improvement  still with significant sob thoug CXR clearing  Hospice follow up noted  lethargic +  family at bedside  PEG on hold secondary to residual    MEDICATIONS  (STANDING):  aspirin enteric coated 81milliGRAM(s) Oral daily  finasteride 5milliGRAM(s) Oral daily  tamsulosin 0.4milliGRAM(s) Oral at bedtime  simvastatin 20milliGRAM(s) Oral at bedtime  multivitamin 1Tablet(s) Oral daily  lactulose Syrup 10Gram(s) Oral daily  ALBUTerol    0.083% 2.5milliGRAM(s) Nebulizer every 6 hours  enoxaparin Injectable 40milliGRAM(s) SubCutaneous daily  meropenem IVPB 500milliGRAM(s) IV Intermittent every 8 hours  levoFLOXacin IVPB  IV Intermittent   levoFLOXacin IVPB 250milliGRAM(s) IV Intermittent every 24 hours  amiodarone    Tablet 200milliGRAM(s) Oral daily  LORazepam   Injectable 0.5milliGRAM(s) IV Push two times a day  morphine Concentrate 2milliGRAM(s) SubLingual every 6 hours  scopolamine   Patch 1.5milliGRAM(s) Transdermal every 3 days  carvedilol 3.125milliGRAM(s) Oral every 12 hours  furosemide   Injectable 20milliGRAM(s) IV Push daily  dextrose 5% + sodium chloride 0.45%. 1000milliLiter(s) IV Continuous <Continuous>  pantoprazole   Suspension 40milliGRAM(s) Oral daily    MEDICATIONS  (PRN):  acetaminophen   Tablet 650milliGRAM(s) Oral every 6 hours PRN For Temp greater than 38 C (100.4 F)        REVIEW OF SYSTEMS:  CONSTITUTIONAL: No fever, weight loss, or fatigue  EYES: No eye pain, visual disturbances, or discharge  ENMT:  No difficulty hearing, tinnitus, vertigo; No sinus or throat pain  NECK: No pain or stiffness  RESPIRATORY: No cough, wheezing, chills or hemoptysis; No shortness of breath  CARDIOVASCULAR: No chest pain, palpitations, dizziness, or leg swelling  GASTROINTESTINAL: No abdominal or epigastric pain. No nausea, vomiting, or hematemesis; No diarrhea or constipation. No melena or hematochezia.  GENITOURINARY: No dysuria, frequency, hematuria, or incontinence  NEUROLOGICAL: No headaches, memory loss, loss of strength, numbness, or tremors  SKIN: No itching, burning, rashes, or lesions      Vital Signs Last 24 Hrs  T(C): 38.8, Max: 38.8 (06-22 @ 11:14)  T(F): 101.8, Max: 101.8 (06-22 @ 11:14)  HR: 110 (83 - 110)  BP: 105/66 (96/58 - 105/66)  BP(mean): --  RR: 18 (18 - 20)  SpO2: 99% (91% - 99%)    PHYSICAL EXAM:  GENERAL: NAD, well-groomed, well-developed  HEAD:  Atraumatic, Normocephalic  EYES: EOMI, PERRLA, conjunctiva and sclera clear  ENMT: No tonsillar erythema, exudates, or enlargement; Moist mucous membranes   NECK: Supple, No JVD   NERVOUS SYSTEM:  Alert & Oriented X3, Good concentration; Motor Strength 5/5 B/L upper and lower extremities; DTRs 2+ intact and symmetric  CHEST/LUNG: Clear to percussion bilaterally; No rales, rhonchi, wheezing, or rubs  HEART: Regular rate and rhythm; No murmurs, rubs, or gallops  ABDOMEN: Soft, Nontender, Nondistended; Bowel sounds present, peg in place  EXTREMITIES:  2+ Peripheral Pulses, No clubbing, cyanosis, or edema  LYMPH: No lymphadenopathy noted  SKIN: No rashes or lesions    LABS:              CAPILLARY BLOOD GLUCOSE      RADIOLOGY & ADDITIONAL TESTS:    Imaging Personally Reviewed:  [ ] YES  [ ] NO    Consultant(s) Notes Reviewed:  [ ] YES  [ ] NO    Care Discussed with Consultants/Other Providers [ ] YES  [ ] NO

## 2017-06-21 NOTE — PROGRESS NOTE ADULT - ASSESSMENT
Continue present therapy  further care per family wishes- palliative/comfort measures- to decide on hospice  continue standing morphine dose

## 2017-06-21 NOTE — PROGRESS NOTE ADULT - SUBJECTIVE AND OBJECTIVE BOX
Patient is a 84y old  Male who presents with a chief complaint of       PAST MEDICAL & SURGICAL HISTORY:  BPH (benign prostatic hyperplasia)  Hypercholesteremia  Hypertension  No significant past surgical history      Summary of admission HPI: SOB                PREVIOUS DIAGNOSTIC TESTING:      ECHO  FINDINGS:    STRESS  FINDINGS:    CATHETERIZATION  FINDINGS:    ELECTROPHYSIOLOGY STUDY  FINDINGS:    CAROTID ULTRASOUND:  FINDINGS    VENOUS DUPLEX SCAN:  FINDINGS:    CHEST CT PULMONARY ANGIO with IV Contrast:  FINDINGS:  MEDICATIONS  (STANDING):  aspirin enteric coated 81milliGRAM(s) Oral daily  finasteride 5milliGRAM(s) Oral daily  tamsulosin 0.4milliGRAM(s) Oral at bedtime  simvastatin 20milliGRAM(s) Oral at bedtime  multivitamin 1Tablet(s) Oral daily  lactulose Syrup 10Gram(s) Oral daily  ALBUTerol    0.083% 2.5milliGRAM(s) Nebulizer every 6 hours  enoxaparin Injectable 40milliGRAM(s) SubCutaneous daily  meropenem IVPB 500milliGRAM(s) IV Intermittent every 8 hours  levoFLOXacin IVPB  IV Intermittent   levoFLOXacin IVPB 250milliGRAM(s) IV Intermittent every 24 hours  amiodarone    Tablet 200milliGRAM(s) Oral daily  LORazepam   Injectable 0.5milliGRAM(s) IV Push two times a day  morphine Concentrate 2milliGRAM(s) SubLingual every 6 hours  scopolamine   Patch 1.5milliGRAM(s) Transdermal every 3 days  carvedilol 3.125milliGRAM(s) Oral every 12 hours  furosemide   Injectable 20milliGRAM(s) IV Push daily  dextrose 5% + sodium chloride 0.45%. 1000milliLiter(s) IV Continuous <Continuous>  pantoprazole   Suspension 40milliGRAM(s) Oral daily    MEDICATIONS  (PRN):      FAMILY HISTORY:  No pertinent family history in first degree relatives      SOCIAL HISTORY:    CIGARETTES:    ALCOHOL:    REVIEW OF SYSTEMS:    CONSTITUTIONAL: No fever, weight loss, chills, shakes, or fatigue  EYES: No eye pain, visual disturbances, or discharge  ENMT:  No difficulty hearing, tinnitus, vertigo; No sinus or throat pain  NECK: No pain or stiffness  BREASTS: No pain, masses, or nipple discharge  RESPIRATORY: No cough, wheezing, hemoptysis, or shortness of breath  CARDIOVASCULAR: No chest pain, dyspnea, palpitations, dizziness, syncope, paroxysmal nocturnal dyspnea, orthopnea, or arm or leg swelling  GASTROINTESTINAL: No abdominal  or epigastric pain, nausea, vomiting, hematemesis, diarrhea, constipation, melena or bright red blood.  GENITOURINARY: No dysuria, nocturia, hematuria, or urinary incontinence  NEUROLOGICAL: No headaches, memory loss, slurred speech, limb weakness, loss of strength, numbness, or tremors  SKIN: No itching, burning, rashes, or lesions   LYMPH NODES: No enlarged glands  ENDOCRINE: No heat or cold intolerance, or hair loss  MUSCULOSKELETAL: No joint pain or swelling, muscle, back, or extremity pain  PSYCHIATRIC: No depression, anxiety, or difficulty sleeping  HEME/LYMPH: No easy bruising or bleeding gums  ALLERY AND IMMUNOLOGIC: No hives or rash.      Vital Signs Last 24 Hrs  T(C): 37.3, Max: 37.7 ( @ 05:02)  T(F): 99.2, Max: 99.8 ( @ 05:02)  HR: 89 (80 - 108)  BP: 102/59 (97/67 - 126/68)  BP(mean): --  RR: 18 (18 - 22)  SpO2: 95% (94% - 98%)    PHYSICAL EXAM:    GENERAL: In no apparent distress, well nourished, and hydrated.  HEAD:  Atraumatic, Normocephalic  EYES: EOMI, PERRLA, conjunctiva and sclera clear  ENMT: No tonsillar erythema, exudates, or enlargement; Moist mucous membranes, Good dentition, No lesions  NECK: Supple and normal thyroid.  No JVD or carotid bruit.  Carotid pulse is 2+ bilaterally.  HEART: Regular rate and rhythm; No murmurs, rubs, or gallops.  PULMONARY: Clear to auscultation and perfusion.  No rales, wheezing, or rhonchi bilaterally.  ABDOMEN: Soft, Nontender, Nondistended; Bowel sounds present  EXTREMITIES:  2+ Peripheral Pulses, No clubbing, cyanosis, or edema  LYMPH: No lymphadenopathy noted  NEUROLOGICAL: Grossly nonfocal          INTERPRETATION OF TELEMETRY:    ECG:    I&O's Detail  I & Os for 24h ending 2017 07:00  =============================================  IN:    Jevity: 1020 ml    Solution: 250 ml    Enteral Tube Flush: 200 ml    Total IN: 1470 ml  ---------------------------------------------  OUT:    Indwelling Catheter - Urethral: 1250 ml    Total OUT: 1250 ml  ---------------------------------------------  Total NET: 220 ml    I & Os for current day (as of 2017 20:44)  =============================================  IN:    dextrose 5% + sodium chloride 0.45%.: 400 ml    Solution: 150 ml    Solution: 100 ml    Oral Fluid: 80 ml    Enteral Tube Flush: 50 ml    Total IN: 780 ml  ---------------------------------------------  OUT:    Total OUT: 0 ml  ---------------------------------------------  Total NET: 780 ml      LABS:                        11.2   16.0  )-----------( 253      ( 2017 10:41 )             33.2     06-20    146<H>  |  110<H>  |  61<H>  ----------------------------<  174<H>  3.7   |  26  |  1.44<H>    Ca    9.1      2017 10:41              BNP  I&O's Detail  I & Os for 24h ending 2017 07:00  =============================================  IN:    Jevity: 1020 ml    Solution: 250 ml    Enteral Tube Flush: 200 ml    Total IN: 1470 ml  ---------------------------------------------  OUT:    Indwelling Catheter - Urethral: 1250 ml    Total OUT: 1250 ml  ---------------------------------------------  Total NET: 220 ml    I & Os for current day (as of 2017 20:44)  =============================================  IN:    dextrose 5% + sodium chloride 0.45%.: 400 ml    Solution: 150 ml    Solution: 100 ml    Oral Fluid: 80 ml    Enteral Tube Flush: 50 ml    Total IN: 780 ml  ---------------------------------------------  OUT:    Total OUT: 0 ml  ---------------------------------------------  Total NET: 780 ml    Daily     Daily Weight in k.6 (2017 05:02)    RADIOLOGY & ADDITIONAL STUDIES:

## 2017-06-21 NOTE — PROGRESS NOTE ADULT - SUBJECTIVE AND OBJECTIVE BOX
· Provider Specialty	Palliative Care	      · Subjective and Objective: 	  HPI:  ? Chief Complaint: The patient is a 84y Male complaining of urinary catheter complications.  ? HPI Objective Statement: Pertinent PMH/PSH/FHx/SHx and Review of Systems contained within:  Patient with history of HTN, HLD, CVA advanced dementia, and indwelling strickland cath (since CVA) presents to the ED for generalized weakness and abdominal pain.  Patient was complaining to his sons about wanting to have a bowel movement, but no diarrhea or melena noted.  Son noted that he was breathing differently yesterday, but has not been coughing. (10 Khanh 2017 14:55)  Vital Signs Last 24 Hrs  T(C): 37.7, Max: 37.7 (06-20 @ 16:14)  T(F): 99.8, Max: 99.8 (06-20 @ 16:14)  HR: 88 (80 - 116)  BP: 109/60 (98/57 - 113/65)  BP(mean): --  RR: 18 (18 - 20)  SpO2: 92% (78% - 98%)  a/p Overall prognosis is poor   suggest Hospice '  Family agrees    Electronic Signatures:  Yumi Jalloh)

## 2017-06-22 NOTE — PROGRESS NOTE ADULT - SUBJECTIVE AND OBJECTIVE BOX
Patient is a 84y old  Male who presents with a chief complaint of     INTERVAL HPI/OVERNIGHT EVENTS:    MEDICATIONS  (STANDING):  aspirin enteric coated 81milliGRAM(s) Oral daily  finasteride 5milliGRAM(s) Oral daily  tamsulosin 0.4milliGRAM(s) Oral at bedtime  simvastatin 20milliGRAM(s) Oral at bedtime  multivitamin 1Tablet(s) Oral daily  lactulose Syrup 10Gram(s) Oral daily  ALBUTerol    0.083% 2.5milliGRAM(s) Nebulizer every 6 hours  enoxaparin Injectable 40milliGRAM(s) SubCutaneous daily  meropenem IVPB 500milliGRAM(s) IV Intermittent every 8 hours  levoFLOXacin IVPB  IV Intermittent   levoFLOXacin IVPB 250milliGRAM(s) IV Intermittent every 24 hours  amiodarone    Tablet 200milliGRAM(s) Oral daily  LORazepam   Injectable 0.5milliGRAM(s) IV Push two times a day  morphine Concentrate 2milliGRAM(s) SubLingual every 6 hours  scopolamine   Patch 1.5milliGRAM(s) Transdermal every 3 days  carvedilol 3.125milliGRAM(s) Oral every 12 hours  furosemide   Injectable 20milliGRAM(s) IV Push daily  dextrose 5% + sodium chloride 0.45%. 1000milliLiter(s) IV Continuous <Continuous>  pantoprazole   Suspension 40milliGRAM(s) Oral daily    MEDICATIONS  (PRN):  acetaminophen   Tablet 650milliGRAM(s) Oral every 6 hours PRN For Temp greater than 38 C (100.4 F)        REVIEW OF SYSTEMS:  CONSTITUTIONAL: No fever, weight loss, or fatigue  EYES: No eye pain, visual disturbances, or discharge  ENMT:  No difficulty hearing, tinnitus, vertigo; No sinus or throat pain  NECK: No pain or stiffness  RESPIRATORY: No cough, wheezing, chills or hemoptysis; No shortness of breath  CARDIOVASCULAR: No chest pain, palpitations, dizziness, or leg swelling  GASTROINTESTINAL: No abdominal or epigastric pain. No nausea, vomiting, or hematemesis; No diarrhea or constipation. No melena or hematochezia.  GENITOURINARY: No dysuria, frequency, hematuria, or incontinence  NEUROLOGICAL: No headaches, memory loss, loss of strength, numbness, or tremors  SKIN: No itching, burning, rashes, or lesions      Vital Signs Last 24 Hrs  T(C): 38.8, Max: 38.8 (06-22 @ 11:14)  T(F): 101.8, Max: 101.8 (06-22 @ 11:14)  HR: 110 (83 - 110)  BP: 105/66 (96/58 - 105/66)  BP(mean): --  RR: 18 (18 - 20)  SpO2: 99% (91% - 99%)    PHYSICAL EXAM:  GENERAL: NAD, well-groomed, well-developed  HEAD:  Atraumatic, Normocephalic  EYES: EOMI, PERRLA, conjunctiva and sclera clear  ENMT: No tonsillar erythema, exudates, or enlargement; Moist mucous membranes   NECK: Supple, No JVD   NERVOUS SYSTEM:  Alert & Oriented X3, Good concentration; Motor Strength 5/5 B/L upper and lower extremities; DTRs 2+ intact and symmetric  CHEST/LUNG: Clear to percussion bilaterally; No rales, rhonchi, wheezing, or rubs  HEART: Regular rate and rhythm; No murmurs, rubs, or gallops  ABDOMEN: Soft, Nontender, Nondistended; Bowel sounds present, peg in place  EXTREMITIES:  2+ Peripheral Pulses, No clubbing, cyanosis, or edema  LYMPH: No lymphadenopathy noted  SKIN: No rashes or lesions    LABS:              CAPILLARY BLOOD GLUCOSE      RADIOLOGY & ADDITIONAL TESTS:    Imaging Personally Reviewed:  [ ] YES  [ ] NO    Consultant(s) Notes Reviewed:  [ ] YES  [ ] NO    Care Discussed with Consultants/Other Providers [ ] YES  [ ] NO

## 2017-06-22 NOTE — PROGRESS NOTE ADULT - SUBJECTIVE AND OBJECTIVE BOX
Pt with multiple medical problems - s.p peg placement last week  tolerating feedings      MEDICATIONS  (STANDING):  aspirin enteric coated 81milliGRAM(s) Oral daily  finasteride 5milliGRAM(s) Oral daily  tamsulosin 0.4milliGRAM(s) Oral at bedtime  simvastatin 20milliGRAM(s) Oral at bedtime  multivitamin 1Tablet(s) Oral daily  lactulose Syrup 10Gram(s) Oral daily  ALBUTerol    0.083% 2.5milliGRAM(s) Nebulizer every 6 hours  enoxaparin Injectable 40milliGRAM(s) SubCutaneous daily  meropenem IVPB 500milliGRAM(s) IV Intermittent every 8 hours  levoFLOXacin IVPB  IV Intermittent   levoFLOXacin IVPB 250milliGRAM(s) IV Intermittent every 24 hours  amiodarone    Tablet 200milliGRAM(s) Oral daily  LORazepam   Injectable 0.5milliGRAM(s) IV Push two times a day  morphine Concentrate 2milliGRAM(s) SubLingual every 6 hours  scopolamine   Patch 1.5milliGRAM(s) Transdermal every 3 days  carvedilol 3.125milliGRAM(s) Oral every 12 hours  furosemide   Injectable 20milliGRAM(s) IV Push daily  dextrose 5% + sodium chloride 0.45%. 1000milliLiter(s) IV Continuous <Continuous>  pantoprazole   Suspension 40milliGRAM(s) Oral daily  acetaminophen  Suppository 325milliGRAM(s) Rectal four times a day  bisacodyl Suppository 10milliGRAM(s) Rectal at bedtime    MEDICATIONS  (PRN):      Allergies    morphine (Other)    Intolerances        Vital Signs Last 24 Hrs  T(C): 38.6, Max: 38.8 (06-22 @ 11:14)  T(F): 101.4, Max: 101.8 (06-22 @ 11:14)  HR: 101 (83 - 110)  BP: 105/66 (96/58 - 105/66)  BP(mean): --  RR: 18 (18 - 20)  SpO2: 92% (91% - 99%)    PHYSICAL EXAM:  General: NAD.  CVS: S1, S2  Chest: air entry bilaterally present  Abd: BS present, soft, non-tender, PEG      Pt tolerating Jevity feedings  palliative care  please re-consult GI if needed. Thanks

## 2017-06-22 NOTE — PROGRESS NOTE ADULT - SUBJECTIVE AND OBJECTIVE BOX
Patient is a 84y old  Male who presents with a chief complaint of       PAST MEDICAL & SURGICAL HISTORY:  BPH (benign prostatic hyperplasia)  Hypercholesteremia  Hypertension  No significant past surgical history      Summary of admission HPI: sob                PREVIOUS DIAGNOSTIC TESTING:      ECHO  FINDINGS:    STRESS  FINDINGS:    CATHETERIZATION  FINDINGS:    ELECTROPHYSIOLOGY STUDY  FINDINGS:    CAROTID ULTRASOUND:  FINDINGS    VENOUS DUPLEX SCAN:  FINDINGS:    CHEST CT PULMONARY ANGIO with IV Contrast:  FINDINGS:  MEDICATIONS  (STANDING):  aspirin enteric coated 81milliGRAM(s) Oral daily  finasteride 5milliGRAM(s) Oral daily  tamsulosin 0.4milliGRAM(s) Oral at bedtime  simvastatin 20milliGRAM(s) Oral at bedtime  multivitamin 1Tablet(s) Oral daily  lactulose Syrup 10Gram(s) Oral daily  ALBUTerol    0.083% 2.5milliGRAM(s) Nebulizer every 6 hours  enoxaparin Injectable 40milliGRAM(s) SubCutaneous daily  amiodarone    Tablet 200milliGRAM(s) Oral daily  LORazepam   Injectable 0.5milliGRAM(s) IV Push two times a day  morphine Concentrate 2milliGRAM(s) SubLingual every 6 hours  scopolamine   Patch 1.5milliGRAM(s) Transdermal every 3 days  carvedilol 3.125milliGRAM(s) Oral every 12 hours  furosemide   Injectable 20milliGRAM(s) IV Push daily  dextrose 5% + sodium chloride 0.45%. 1000milliLiter(s) IV Continuous <Continuous>  pantoprazole   Suspension 40milliGRAM(s) Oral daily  acetaminophen  Suppository 325milliGRAM(s) Rectal four times a day  bisacodyl Suppository 10milliGRAM(s) Rectal at bedtime    MEDICATIONS  (PRN):      FAMILY HISTORY:  No pertinent family history in first degree relatives      SOCIAL HISTORY:    CIGARETTES:    ALCOHOL:    REVIEW OF SYSTEMS:    CONSTITUTIONAL: No fever, weight loss, chills, shakes, or fatigue  EYES: No eye pain, visual disturbances, or discharge  ENMT:  No difficulty hearing, tinnitus, vertigo; No sinus or throat pain  NECK: No pain or stiffness  BREASTS: No pain, masses, or nipple discharge  RESPIRATORY: No cough, wheezing, hemoptysis, or shortness of breath  CARDIOVASCULAR: No chest pain, dyspnea, palpitations, dizziness, syncope, paroxysmal nocturnal dyspnea, orthopnea, or arm or leg swelling  GASTROINTESTINAL: No abdominal  or epigastric pain, nausea, vomiting, hematemesis, diarrhea, constipation, melena or bright red blood.  GENITOURINARY: No dysuria, nocturia, hematuria, or urinary incontinence  NEUROLOGICAL: No headaches, memory loss, slurred speech, limb weakness, loss of strength, numbness, or tremors  SKIN: No itching, burning, rashes, or lesions   LYMPH NODES: No enlarged glands  ENDOCRINE: No heat or cold intolerance, or hair loss  MUSCULOSKELETAL: No joint pain or swelling, muscle, back, or extremity pain  PSYCHIATRIC: No depression, anxiety, or difficulty sleeping  HEME/LYMPH: No easy bruising or bleeding gums  ALLERY AND IMMUNOLOGIC: No hives or rash.      Vital Signs Last 24 Hrs  T(C): 37.2, Max: 38.8 (06-22 @ 11:14)  T(F): 99, Max: 101.8 (06-22 @ 11:14)  HR: 88 (88 - 110)  BP: 105/63 (96/58 - 105/66)  BP(mean): --  RR: 20 (18 - 20)  SpO2: 95% (91% - 99%)    PHYSICAL EXAM:    GENERAL: In no apparent distress, well nourished, and hydrated.  HEAD:  Atraumatic, Normocephalic  EYES: EOMI, PERRLA, conjunctiva and sclera clear  ENMT: No tonsillar erythema, exudates, or enlargement; Moist mucous membranes, Good dentition, No lesions  NECK: Supple and normal thyroid.  No JVD or carotid bruit.  Carotid pulse is 2+ bilaterally.  HEART: Regular rate and rhythm; No murmurs, rubs, or gallops.  PULMONARY: Clear to auscultation and perfusion.  No rales, wheezing, or rhonchi bilaterally.  ABDOMEN: Soft, Nontender, Nondistended; Bowel sounds present  EXTREMITIES:  2+ Peripheral Pulses, No clubbing, cyanosis, or edema  LYMPH: No lymphadenopathy noted  NEUROLOGICAL: Grossly nonfocal          INTERPRETATION OF TELEMETRY:    ECG:    I&O's Detail    I & Os for current day (as of 22 Jun 2017 21:02)  =============================================  IN:    dextrose 5% + sodium chloride 0.45%.: 400 ml    Solution: 150 ml    Solution: 100 ml    Oral Fluid: 80 ml    Enteral Tube Flush: 50 ml    Total IN: 780 ml  ---------------------------------------------  OUT:    Indwelling Catheter - Urethral: 400 ml    Total OUT: 400 ml  ---------------------------------------------  Total NET: 380 ml      LABS:                  BNP  I&O's Detail    I & Os for current day (as of 22 Jun 2017 21:02)  =============================================  IN:    dextrose 5% + sodium chloride 0.45%.: 400 ml    Solution: 150 ml    Solution: 100 ml    Oral Fluid: 80 ml    Enteral Tube Flush: 50 ml    Total IN: 780 ml  ---------------------------------------------  OUT:    Indwelling Catheter - Urethral: 400 ml    Total OUT: 400 ml  ---------------------------------------------  Total NET: 380 ml    Daily     Daily     RADIOLOGY & ADDITIONAL STUDIES:

## 2017-06-22 NOTE — PROGRESS NOTE ADULT - ASSESSMENT
Continue present therapy  further care per family wishes- palliative/comfort measures-   discussed with daughter- advised inpatient hospice care  continue standing morphine dose

## 2017-06-22 NOTE — PROGRESS NOTE ADULT - SUBJECTIVE AND OBJECTIVE BOX
The patient is a 84y Male complaining of urinary catheter complications.  Patient with history of HTN, HLD, CVA advanced dementia, and indwelling strickland cath (since CVA) presents to the ED for generalized weakness and abdominal pain.  Patient was complaining to his sons about wanting to have a bowel movement, but no diarrhea or melena noted.  Son noted that he was breathing differently yesterday, but has not been coughing. (10 Khanh 2017 14:55  admitted with likely aspiration  for hospice   ongoing worsening of mental status ? morphine   congested still   obtunded    Allergies    morphine (Other)    Intolerances        MEDICATIONS  (STANDING):  aspirin enteric coated 81milliGRAM(s) Oral daily  finasteride 5milliGRAM(s) Oral daily  tamsulosin 0.4milliGRAM(s) Oral at bedtime  simvastatin 20milliGRAM(s) Oral at bedtime  multivitamin 1Tablet(s) Oral daily  lactulose Syrup 10Gram(s) Oral daily  ALBUTerol    0.083% 2.5milliGRAM(s) Nebulizer every 6 hours  enoxaparin Injectable 40milliGRAM(s) SubCutaneous daily  meropenem IVPB 500milliGRAM(s) IV Intermittent every 8 hours  levoFLOXacin IVPB  IV Intermittent   levoFLOXacin IVPB 250milliGRAM(s) IV Intermittent every 24 hours  amiodarone    Tablet 200milliGRAM(s) Oral daily  LORazepam   Injectable 0.5milliGRAM(s) IV Push two times a day  morphine Concentrate 2milliGRAM(s) SubLingual every 6 hours  scopolamine   Patch 1.5milliGRAM(s) Transdermal every 3 days  carvedilol 3.125milliGRAM(s) Oral every 12 hours  furosemide   Injectable 20milliGRAM(s) IV Push daily  dextrose 5% + sodium chloride 0.45%. 1000milliLiter(s) IV Continuous <Continuous>  pantoprazole   Suspension 40milliGRAM(s) Oral daily  acetaminophen  Suppository 325milliGRAM(s) Rectal four times a day  bisacodyl Suppository 10milliGRAM(s) Rectal at bedtime    MEDICATIONS  (PRN):      REVIEW OF SYSTEMS:  unable to obtain VITAL SIGNS:  T(C): 37.2, Max: 38.8 (06-22 @ 11:14)  T(F): 99, Max: 101.8 (06-22 @ 11:14)  HR: 88 (88 - 110)  BP: 105/63 (96/58 - 105/66)  RR: 20 (18 - 20)  SpO2: 95% (91% - 99%)  Wt(kg): --    PHYSICAL EXAM:    GENERAL: gurgling with secretions   need suction   HEENT: Neck is supple, normocephalic, atraumatic   CHEST/LUNG: Clear to percussion bilaterally; No rales, rhonchi, wheezing  HEART: Regular rate and rhythm; No murmurs, rubs, or gallops  ABDOMEN: Soft, Nontender, Nondistended; Bowel sounds present, no rebound   peg plus   EXTREMITIES:  2+ Peripheral Pulses, No clubbing, cyanosis, or edema  GENITOURINARY: strickland ; some sediment now   SKIN: no change in status   BACK: no pressor sore   NERVOUS SYSTEM: unresponsive obtunded congested  LABS:       none                                             Radiology:      Impression: Diffuse CHF and pulmonary edema. Improvement in airspace   disease in upper lobes              DIONE VAZQUEZ M.D., ATTENDING RADIOLOGIST  This document has been electronically signed. Jun 19 2017 12:11PM

## 2017-06-22 NOTE — PROGRESS NOTE ADULT - ASSESSMENT
community acquired pneumonia  resp distress  Altered Mental Status   dnr  continue supportive care  for hospice noted ; still pending   worsening leucocytosis  failed zosyn 6/9-16  no response to merrem and levaquin ; day 7  dc and watch   follow up labs and xray

## 2017-06-23 NOTE — PROGRESS NOTE ADULT - PROBLEM SELECTOR PROBLEM 2
Sepsis, due to unspecified organism
Hypertension
Hypertension
Pulmonary congestion
Sepsis, due to unspecified organism

## 2017-06-23 NOTE — PROGRESS NOTE ADULT - PROBLEM SELECTOR PLAN 5
statins  low cholesterol diet

## 2017-06-23 NOTE — PROGRESS NOTE ADULT - PROBLEM SELECTOR PLAN 2
urine culture: greater than 100,000 cfu e. coli  blood culture: no growth to date  continue antibiotics
urine culture: greater than 100,000 cfu e. coli  blood culture: no growth to date  continue antibiotics
6/15/2017: broad spectrum antibiotic coverage extended  urine culture: greater than 100,000 cfu e. coli  blood culture: no growth to date  continue antibiotics
6/15/2017: broad spectrum antibiotic coverage extended  urine culture: greater than 100,000 cfu e. coli  blood culture: no growth to date  continue antibiotics  infectious disease follow up
as per omd
continue antibiotics
control BP
control BP
diuresis
diuresis preload and afterload reduction
diuresis, preload and afterload reduction.
secondary to bilateral pneumonia /UTI   broad spectrum antibiotic coverage completed  urine culture: greater than 100,000 cfu e. coli  blood culture: no growth to date  infectious disease follow up
secondary to bilateral pneumonia /UTI   broad spectrum antibiotic coverage extended  urine culture: greater than 100,000 cfu e. coli  blood culture: no growth to date  continue antibiotics  infectious disease follow up
secondary to bilateral pneumonia /UTI   broad spectrum antibiotic coverage extended  urine culture: greater than 100,000 cfu e. coli  blood culture: no growth to date  continue antibiotics  infectious disease follow up
urine culture: greater than 100,000 cfu e. coli  blood culture: no growth to date  continue antibiotics
urine culture: greater than 100,000 cfu e. coli  blood culture: no growth to date  continue antibiotics

## 2017-06-23 NOTE — PROGRESS NOTE ADULT - PROBLEM SELECTOR PROBLEM 7
Dysphagia, unspecified type

## 2017-06-23 NOTE — PROGRESS NOTE ADULT - PROVIDER SPECIALTY LIST ADULT
Cardiology
Gastroenterology
Infectious Disease
Infectious Disease
Internal Medicine
Palliative Care
Infectious Disease

## 2017-06-23 NOTE — PROGRESS NOTE ADULT - ASSESSMENT
Continue present therapy  further care per family wishes- palliative/comfort measures-   discussed with daughter- advised inpatient hospice care  continue other meds

## 2017-06-23 NOTE — PROGRESS NOTE ADULT - PROBLEM SELECTOR PLAN 1
Cardiology consult noted  Aspirin   Plavix
Cardiology consult noted  Aspirin   Plavix  Rate controlled a fib- change to oral amiodarone
medical management
risk stratification
Cardiology consult noted  Aspirin   Plavix

## 2017-06-23 NOTE — PROGRESS NOTE ADULT - PROBLEM SELECTOR PROBLEM 9
Acute systolic congestive heart failure

## 2017-06-23 NOTE — PROGRESS NOTE ADULT - PROBLEM SELECTOR PLAN 7
Speech/swallow recommendations: NPO  Gastroenterology consult requested  Continue IV fluids
s/p peg tube placement on 6/16/2017  continue enteral feedings
Speech/swallow recommendations: NPO  Gastroenterology consult requested  Continue IV fluids

## 2017-06-23 NOTE — PROGRESS NOTE ADULT - PROBLEM SELECTOR PLAN 9
EF 25 %. s/p NSTEMI- continue meds

## 2017-06-23 NOTE — PROGRESS NOTE ADULT - PROBLEM SELECTOR PROBLEM 6
Benign prostatic hyperplasia with lower urinary tract symptoms, unspecified morphology

## 2017-06-23 NOTE — PROGRESS NOTE ADULT - PROBLEM SELECTOR PROBLEM 5
Hypercholesteremia

## 2017-06-23 NOTE — PROGRESS NOTE ADULT - PROBLEM SELECTOR PROBLEM 1
NSTEMI (non-ST elevated myocardial infarction)
Urinary tract infection without hematuria, site unspecified
Urinary tract infection without hematuria, site unspecified
NSTEMI (non-ST elevated myocardial infarction)
Urinary tract infection without hematuria, site unspecified
NSTEMI (non-ST elevated myocardial infarction)
Urinary tract infection without hematuria, site unspecified

## 2017-06-23 NOTE — PROGRESS NOTE ADULT - PROBLEM SELECTOR PROBLEM 3
BPH (benign prostatic hyperplasia)
Urinary tract infection without hematuria, site unspecified
Acute systolic congestive heart failure
BPH (benign prostatic hyperplasia)
Hypertension

## 2017-06-23 NOTE — PROGRESS NOTE ADULT - SUBJECTIVE AND OBJECTIVE BOX
Patient is a 84y old  Male who presents with a chief complaint of     INTERVAL HPI/OVERNIGHT EVENTS:  clinically unchanged  no fever today  ID follow up appreciated    MEDICATIONS  (STANDING):  aspirin enteric coated 81milliGRAM(s) Oral daily  finasteride 5milliGRAM(s) Oral daily  tamsulosin 0.4milliGRAM(s) Oral at bedtime  simvastatin 20milliGRAM(s) Oral at bedtime  multivitamin 1Tablet(s) Oral daily  lactulose Syrup 10Gram(s) Oral daily  ALBUTerol    0.083% 2.5milliGRAM(s) Nebulizer every 6 hours  enoxaparin Injectable 40milliGRAM(s) SubCutaneous daily  amiodarone    Tablet 200milliGRAM(s) Oral daily  LORazepam   Injectable 0.5milliGRAM(s) IV Push two times a day  morphine Concentrate 2milliGRAM(s) SubLingual every 6 hours  scopolamine   Patch 1.5milliGRAM(s) Transdermal every 3 days  carvedilol 3.125milliGRAM(s) Oral every 12 hours  furosemide   Injectable 20milliGRAM(s) IV Push daily  dextrose 5% + sodium chloride 0.45%. 1000milliLiter(s) IV Continuous <Continuous>  pantoprazole   Suspension 40milliGRAM(s) Oral daily  acetaminophen  Suppository 325milliGRAM(s) Rectal four times a day  bisacodyl Suppository 10milliGRAM(s) Rectal at bedtime    MEDICATIONS  (PRN):        REVIEW OF SYSTEMS:  CONSTITUTIONAL: No fever, weight loss, or fatigue  EYES: No eye pain, visual disturbances, or discharge  ENMT:  No difficulty hearing, tinnitus, vertigo; No sinus or throat pain  NECK: No pain or stiffness  RESPIRATORY: No cough, wheezing, chills or hemoptysis; No shortness of breath  CARDIOVASCULAR: No chest pain, palpitations, dizziness, or leg swelling  GASTROINTESTINAL: No abdominal or epigastric pain. No nausea, vomiting, or hematemesis; No diarrhea or constipation. No melena or hematochezia.  GENITOURINARY: No dysuria, frequency, hematuria, or incontinence  NEUROLOGICAL: No headaches, memory loss, loss of strength, numbness, or tremors  SKIN: No itching, burning, rashes, or lesions      Vital Signs Last 24 Hrs  T(C): 37.2, Max: 38.8 (06-22 @ 11:14)  T(F): 98.9, Max: 101.8 (06-22 @ 11:14)  HR: 119 (88 - 127)  BP: 116/70 (99/72 - 116/70)  BP(mean): --  RR: 17 (17 - 20)  SpO2: 97% (92% - 99%)    PHYSICAL EXAM:  GENERAL: NAD, well-groomed, well-developed, less sob  HEAD:  Atraumatic, Normocephalic  EYES: EOMI, PERRLA, conjunctiva and sclera clear  ENMT: No tonsillar erythema, exudates, or enlargement; Moist mucous membranes   NECK: Supple, No JVD   NERVOUS SYSTEM:  Alert & Oriented X3, Good concentration; Motor Strength 5/5 B/L upper and lower extremities; DTRs 2+ intact and symmetric  CHEST/LUNG: Clear to percussion bilaterally; No rales, rhonchi, wheezing, or rubs  HEART: Regular rate and rhythm; No murmurs, rubs, or gallops  ABDOMEN: Soft, Nontender, Nondistended; Bowel sounds present, peg in situ  EXTREMITIES:  2+ Peripheral Pulses, No clubbing, cyanosis, or edema  LYMPH: No lymphadenopathy noted  SKIN: No rashes or lesions    LABS:                        11.7   17.1  )-----------( 283      ( 23 Jun 2017 06:56 )             35.7     06-23    153<H>  |  117<H>  |  68<H>  ----------------------------<  154<H>  4.3   |  26  |  1.35<H>    Ca    8.8      23 Jun 2017 06:56    TPro  6.4  /  Alb  2.4<L>  /  TBili  1.6<H>  /  DBili  x   /  AST  124<H>  /  ALT  136<H>  /  AlkPhos  102  06-23        CAPILLARY BLOOD GLUCOSE      RADIOLOGY & ADDITIONAL TESTS:    Imaging Personally Reviewed:  [ ] YES  [ ] NO    Consultant(s) Notes Reviewed:  [ ] YES  [ ] NO    Care Discussed with Consultants/Other Providers [ ] YES  [ ] NO

## 2017-06-23 NOTE — PROGRESS NOTE ADULT - SUBJECTIVE AND OBJECTIVE BOX
Patient is a 84y old  Male who presents with a chief complaint of       PAST MEDICAL & SURGICAL HISTORY:  BPH (benign prostatic hyperplasia)  Hypercholesteremia  Hypertension  No significant past surgical history      Summary of admission HPI:SOB                PREVIOUS DIAGNOSTIC TESTING:      ECHO  FINDINGS:    STRESS  FINDINGS:    CATHETERIZATION  FINDINGS:    ELECTROPHYSIOLOGY STUDY  FINDINGS:    CAROTID ULTRASOUND:  FINDINGS    VENOUS DUPLEX SCAN:  FINDINGS:    CHEST CT PULMONARY ANGIO with IV Contrast:  FINDINGS:  MEDICATIONS  (STANDING):  aspirin enteric coated 81milliGRAM(s) Oral daily  finasteride 5milliGRAM(s) Oral daily  tamsulosin 0.4milliGRAM(s) Oral at bedtime  simvastatin 20milliGRAM(s) Oral at bedtime  multivitamin 1Tablet(s) Oral daily  lactulose Syrup 10Gram(s) Oral daily  ALBUTerol    0.083% 2.5milliGRAM(s) Nebulizer every 6 hours  enoxaparin Injectable 40milliGRAM(s) SubCutaneous daily  amiodarone    Tablet 200milliGRAM(s) Oral daily  LORazepam   Injectable 0.5milliGRAM(s) IV Push two times a day  morphine Concentrate 2milliGRAM(s) SubLingual every 6 hours  scopolamine   Patch 1.5milliGRAM(s) Transdermal every 3 days  carvedilol 3.125milliGRAM(s) Oral every 12 hours  furosemide   Injectable 20milliGRAM(s) IV Push daily  dextrose 5% + sodium chloride 0.45%. 1000milliLiter(s) IV Continuous <Continuous>  pantoprazole   Suspension 40milliGRAM(s) Oral daily  acetaminophen  Suppository 325milliGRAM(s) Rectal four times a day  bisacodyl Suppository 10milliGRAM(s) Rectal at bedtime    MEDICATIONS  (PRN):      FAMILY HISTORY:  No pertinent family history in first degree relatives      SOCIAL HISTORY:    CIGARETTES:    ALCOHOL:    REVIEW OF SYSTEMS:    CONSTITUTIONAL: No fever, weight loss, chills, shakes, or fatigue  EYES: No eye pain, visual disturbances, or discharge  ENMT:  No difficulty hearing, tinnitus, vertigo; No sinus or throat pain  NECK: No pain or stiffness  BREASTS: No pain, masses, or nipple discharge  RESPIRATORY: No cough, wheezing, hemoptysis, or shortness of breath  CARDIOVASCULAR: No chest pain, dyspnea, palpitations, dizziness, syncope, paroxysmal nocturnal dyspnea, orthopnea, or arm or leg swelling  GASTROINTESTINAL: No abdominal  or epigastric pain, nausea, vomiting, hematemesis, diarrhea, constipation, melena or bright red blood.  GENITOURINARY: No dysuria, nocturia, hematuria, or urinary incontinence  NEUROLOGICAL: No headaches, memory loss, slurred speech, limb weakness, loss of strength, numbness, or tremors  SKIN: No itching, burning, rashes, or lesions   LYMPH NODES: No enlarged glands  ENDOCRINE: No heat or cold intolerance, or hair loss  MUSCULOSKELETAL: No joint pain or swelling, muscle, back, or extremity pain  PSYCHIATRIC: No depression, anxiety, or difficulty sleeping  HEME/LYMPH: No easy bruising or bleeding gums  ALLERY AND IMMUNOLOGIC: No hives or rash.      Vital Signs Last 24 Hrs  T(C): 37.2, Max: 38.8 (06-22 @ 11:14)  T(F): 98.9, Max: 101.8 (06-22 @ 11:14)  HR: 119 (88 - 127)  BP: 116/70 (99/72 - 116/70)  BP(mean): --  RR: 17 (17 - 20)  SpO2: 97% (92% - 99%)    PHYSICAL EXAM:    GENERAL: In no apparent distress, well nourished, and hydrated.  HEAD:  Atraumatic, Normocephalic  EYES: EOMI, PERRLA, conjunctiva and sclera clear  ENMT: No tonsillar erythema, exudates, or enlargement; Moist mucous membranes, Good dentition, No lesions  NECK: Supple and normal thyroid.  No JVD or carotid bruit.  Carotid pulse is 2+ bilaterally.  HEART: Regular rate and rhythm; No murmurs, rubs, or gallops.  PULMONARY: Clear to auscultation and perfusion.  No rales, wheezing, or rhonchi bilaterally.  ABDOMEN: Soft, Nontender, Nondistended; Bowel sounds present  EXTREMITIES:  2+ Peripheral Pulses, No clubbing, cyanosis, or edema  LYMPH: No lymphadenopathy noted  NEUROLOGICAL: Grossly nonfocal          INTERPRETATION OF TELEMETRY:    ECG:    I&O's Detail    I & Os for current day (as of 23 Jun 2017 08:32)  =============================================  IN:    Total IN: 0 ml  ---------------------------------------------  OUT:    Indwelling Catheter - Urethral: 650 ml    Total OUT: 650 ml  ---------------------------------------------  Total NET: -650 ml      LABS:                        11.7   17.1  )-----------( 283      ( 23 Jun 2017 06:56 )             35.7     06-23    153<H>  |  117<H>  |  68<H>  ----------------------------<  154<H>  4.3   |  26  |  1.35<H>    Ca    8.8      23 Jun 2017 06:56    TPro  6.4  /  Alb  2.4<L>  /  TBili  1.6<H>  /  DBili  x   /  AST  124<H>  /  ALT  136<H>  /  AlkPhos  102  06-23            BNP  I&O's Detail    I & Os for current day (as of 23 Jun 2017 08:32)  =============================================  IN:    Total IN: 0 ml  ---------------------------------------------  OUT:    Indwelling Catheter - Urethral: 650 ml    Total OUT: 650 ml  ---------------------------------------------  Total NET: -650 ml    Daily     Daily     RADIOLOGY & ADDITIONAL STUDIES:

## 2017-06-23 NOTE — PROGRESS NOTE ADULT - PROBLEM SELECTOR PROBLEM 4
Pulmonary congestion

## 2017-06-23 NOTE — PROGRESS NOTE ADULT - SUBJECTIVE AND OBJECTIVE BOX
HPI:  ? Chief Complaint: The patient is a 84y Male complaining of urinary catheter complications.  ? HPI Objective Statement: Pertinent PMH/PSH/FHx/SHx and Review of Systems contained within:  Patient with history of HTN, HLD, CVA advanced dementia, and indwelling strickland cath (since CVA) presents to the ED for generalized weakness and abdominal pain.  Patient was complaining to his sons about wanting to have a bowel movement, but no diarrhea or melena noted.  Son noted that he was breathing differently yesterday, but has not been coughing. (10 Khanh 2017 14:55)    Vital Signs Last 24 Hrs  T(C): 36.9, Max: 37.3 (06-22 @ 23:29)  T(F): 98.4, Max: 99.2 (06-22 @ 23:29)  HR: 117 (88 - 127)  BP: 109/83 (99/72 - 116/70)  BP(mean): --  RR: 19 (17 - 20)  SpO2: 95% (92% - 97%)  labs tev  a/p Overall prognosis is poor   pt is debilitated  anf malnurished  Agree w Hospice

## 2017-06-23 NOTE — PROGRESS NOTE ADULT - PROBLEM SELECTOR PLAN 6
chronic strickland  urology follow up

## 2017-06-23 NOTE — PROGRESS NOTE ADULT - PROBLEM SELECTOR PROBLEM 8
Pulmonary hypertension

## 2017-06-23 NOTE — PROGRESS NOTE ADULT - PROBLEM SELECTOR PLAN 3
urine culture: greater than 100,000 cfu e. coli  blood culture: no growth to date  continue antibiotics
urine culture: greater than 100,000 cfu e. coli  blood culture: no growth to date  continue antibiotics
control BP
medical management
urine culture: greater than 100,000 cfu e. coli  blood culture: no growth to date  completed antibiotics
urine culture: greater than 100,000 cfu e. coli  blood culture: no growth to date  continue antibiotics

## 2017-06-23 NOTE — PROGRESS NOTE ADULT - PROBLEM SELECTOR PLAN 4
supplemental O2    gentle diuresis if b/p permits
supplemental O2    gentle diuresis if b/p permits
supplemental O2    add lasix
supplemental O2    gentle diuresis if b/p permits

## 2017-06-24 NOTE — H&P ADULT - NSHPPHYSICALEXAM_GEN_ALL_CORE
PHYSICAL EXAM:  GENERAL: Appears stated age, NAD achetic  HEENT: Atraumatic, EOMI, hearing normal, conjunctiva and sclera clear  Chest: CTA bilaterally, no rhonchi, rales or wheezing  CV: S1S2, RRR, no murmurs, rubs, or edema  GI: soft, +BS, NT/ND,   Psychiatric: affect nL, mood nL  Skin: warm and dry  CNS: Lethargic

## 2017-06-25 NOTE — PROGRESS NOTE ADULT - SUBJECTIVE AND OBJECTIVE BOX
Chief Complaints:  Patient is a 84y old  Male who presents with a chief complaint of comfort care and hospice admit (23 Jun 2017 22:39)      Interval History: Not moaning in pain and looks comfortable     ROS: Unchanged    Physical Exam:    In NAD:    Vital Signs Last 24 Hrs  T(C): 38.3, Max: 38.3 (06-25 @ 04:55)  T(F): 100.9, Max: 100.9 (06-25 @ 04:55)  HR: 134 (101 - 134)  BP: 111/72 (107/66 - 111/72)  BP(mean): --  RR: 16 (16 - 22)  SpO2: 93% (93% - 94%)    Eyes: Anicteric.Cachetic  Neck: Supple No jugular venous distention  Chest: Good air entry bilaterally  CVS: S1 and S2 regular  Abdomen: Soft BS + in all quadrant, no tenderness, no rebound tenderness and guarding.  Extremity: No edema cyanosis or clubbing.  CNS: awake and alert.    MEDICATIONS  (STANDING):  LORazepam   Injectable 0.5milliGRAM(s) IV Push daily  pantoprazole   Suspension 40milliGRAM(s) Oral daily  amiodarone    Tablet 200milliGRAM(s) Oral daily            CAPILLARY BLOOD GLUCOSE Chief Complaints:  Patient is a 84y old  Male who presents with a chief complaint of comfort care and hospice admit (23 Jun 2017 22:39)      Interval History: Not moaning in pain and looks comfortable     ROS: Unchanged    Physical Exam:    In NAD:    Vital Signs Last 24 Hrs  T(C): 38.3, Max: 38.3 (06-25 @ 04:55)  T(F): 100.9, Max: 100.9 (06-25 @ 04:55)  HR: 134 (101 - 134)  BP: 111/72 (107/66 - 111/72)  BP(mean): --  RR: 16 (16 - 22)  SpO2: 93% (93% - 94%)    Eyes: Anicteric.Cachetic  Neck: Supple No jugular venous distention  Chest: Good air entry bilaterally  CVS: S1 and S2 regular  Abdomen: Soft BS + in all quadrant, no tenderness, no rebound tenderness and guarding.  Extremity: No edema cyanosis or clubbing.  CNS: Lethargic    MEDICATIONS  (STANDING):  LORazepam   Injectable 0.5milliGRAM(s) IV Push daily  pantoprazole   Suspension 40milliGRAM(s) Oral daily  amiodarone    Tablet 200milliGRAM(s) Oral daily            CAPILLARY BLOOD GLUCOSE

## 2017-06-26 NOTE — PROGRESS NOTE ADULT - SUBJECTIVE AND OBJECTIVE BOX
Patient is a 84y old  Male who presents with a chief complaint of comfort care and hospice admit (23 Jun 2017 22:39)      INTERVAL HPI/OVERNIGHT EVENTS:    MEDICATIONS  (STANDING):  LORazepam   Injectable 0.5milliGRAM(s) IV Push daily  pantoprazole   Suspension 40milliGRAM(s) Oral daily  amiodarone    Tablet 200milliGRAM(s) Oral daily  ibuprofen  Suspension 400milliGRAM(s) Oral three times a day    MEDICATIONS  (PRN):  glycopyrrolate Injectable 0.2milliGRAM(s) IV Push every 8 hours PRN secretions  morphine Concentrate 2milliGRAM(s) Oral every 6 hours PRN Mild Pain (1 - 3)  morphine  - Injectable 2milliGRAM(s) IV Push every 3 hours PRN Moderate Pain (4 - 6)  acetaminophen  Suppository 650milliGRAM(s) Rectal every 6 hours PRN For Temp greater than 38 C (100.4 F)        REVIEW OF SYSTEMS:  CONSTITUTIONAL: No fever, weight loss, or fatigue  EYES: No eye pain, visual disturbances, or discharge  ENMT:  No difficulty hearing, tinnitus, vertigo; No sinus or throat pain  NECK: No pain or stiffness  RESPIRATORY: No cough, wheezing, chills or hemoptysis; No shortness of breath  CARDIOVASCULAR: No chest pain, palpitations, dizziness, or leg swelling  GASTROINTESTINAL: No abdominal or epigastric pain. No nausea, vomiting, or hematemesis; No diarrhea or constipation. No melena or hematochezia.  GENITOURINARY: No dysuria, frequency, hematuria, or incontinence  NEUROLOGICAL: No headaches, memory loss, loss of strength, numbness, or tremors  SKIN: No itching, burning, rashes, or lesions      Vital Signs Last 24 Hrs  T(C): 38.2, Max: 38.9 (06-25 @ 16:03)  T(F): 100.8, Max: 102 (06-25 @ 16:03)  HR: 104 (104 - 130)  BP: 110/57 (99/63 - 110/57)  BP(mean): --  RR: 18 (18 - 20)  SpO2: 97% (96% - 98%)    PHYSICAL EXAM:  GENERAL: NAD, well-groomed, well-developed  HEAD:  Atraumatic, Normocephalic  EYES: EOMI, PERRLA, conjunctiva and sclera clear  ENMT: No tonsillar erythema, exudates, or enlargement; Moist mucous membranes   NECK: Supple, No JVD   NERVOUS SYSTEM:  Alert & Oriented X3, Good concentration; Motor Strength 5/5 B/L upper and lower extremities; DTRs 2+ intact and symmetric  CHEST/LUNG: Clear to percussion bilaterally; No rales, rhonchi, wheezing, or rubs  HEART: Regular rate and rhythm; No murmurs, rubs, or gallops  ABDOMEN: Soft, Nontender, Nondistended; Bowel sounds present  EXTREMITIES:  2+ Peripheral Pulses, No clubbing, cyanosis, or edema  LYMPH: No lymphadenopathy noted  SKIN: No rashes or lesions    LABS:              CAPILLARY BLOOD GLUCOSE      RADIOLOGY & ADDITIONAL TESTS:    Imaging Personally Reviewed:  [ ] YES  [ ] NO    Consultant(s) Notes Reviewed:  [ ] YES  [ ] NO    Care Discussed with Consultants/Other Providers [ ] YES  [ ] NO Patient is a 84y old  Male who presents with a chief complaint of comfort care and hospice admit (23 Jun 2017 22:39)      INTERVAL HPI/OVERNIGHT EVENTS:    calm  discussed with daughter  continue present therapy, delay discharge home    MEDICATIONS  (STANDING):  LORazepam   Injectable 0.5milliGRAM(s) IV Push daily  pantoprazole   Suspension 40milliGRAM(s) Oral daily  amiodarone    Tablet 200milliGRAM(s) Oral daily  ibuprofen  Suspension 400milliGRAM(s) Oral three times a day    MEDICATIONS  (PRN):  glycopyrrolate Injectable 0.2milliGRAM(s) IV Push every 8 hours PRN secretions  morphine Concentrate 2milliGRAM(s) Oral every 6 hours PRN Mild Pain (1 - 3)  morphine  - Injectable 2milliGRAM(s) IV Push every 3 hours PRN Moderate Pain (4 - 6)  acetaminophen  Suppository 650milliGRAM(s) Rectal every 6 hours PRN For Temp greater than 38 C (100.4 F)        REVIEW OF SYSTEMS:  CONSTITUTIONAL: No fever, weight loss, or fatigue  EYES: No eye pain, visual disturbances, or discharge  ENMT:  No difficulty hearing, tinnitus, vertigo; No sinus or throat pain  NECK: No pain or stiffness  RESPIRATORY: No cough, wheezing, chills or hemoptysis; No shortness of breath  CARDIOVASCULAR: No chest pain, palpitations, dizziness, or leg swelling  GASTROINTESTINAL: No abdominal or epigastric pain. No nausea, vomiting, or hematemesis; No diarrhea or constipation. No melena or hematochezia.  GENITOURINARY: No dysuria, frequency, hematuria, or incontinence  NEUROLOGICAL: No headaches, memory loss, loss of strength, numbness, or tremors  SKIN: No itching, burning, rashes, or lesions      Vital Signs Last 24 Hrs  T(C): 38.2, Max: 38.9 (06-25 @ 16:03)  T(F): 100.8, Max: 102 (06-25 @ 16:03)  HR: 104 (104 - 130)  BP: 110/57 (99/63 - 110/57)  BP(mean): --  RR: 18 (18 - 20)  SpO2: 97% (96% - 98%)    PHYSICAL EXAM:  GENERAL: NAD, well-groomed, well-developed,lethargic  HEAD:  Atraumatic, Normocephalic  EYES: EOMI, PERRLA, conjunctiva and sclera clear  ENMT: No tonsillar erythema, exudates, or enlargement; Moist mucous membranes   NECK: Supple, No JVD   NERVOUS SYSTEM:  Alert & Oriented X3, Good concentration; Motor Strength 5/5 B/L upper and lower extremities; DTRs 2+ intact and symmetric  CHEST/LUNG: Clear to percussion bilaterally; No rales, rhonchi, wheezing, or rubs  HEART: Regular rate and rhythm; No murmurs, rubs, or gallops  ABDOMEN: Soft, Nontender, Nondistended; Bowel sounds present  EXTREMITIES:  2+ Peripheral Pulses, No clubbing, cyanosis, or edema  LYMPH: No lymphadenopathy noted  SKIN: No rashes or lesions    LABS:              CAPILLARY BLOOD GLUCOSE      RADIOLOGY & ADDITIONAL TESTS:    Imaging Personally Reviewed:  [ ] YES  [ ] NO    Consultant(s) Notes Reviewed:  [ ] YES  [ ] NO    Care Discussed with Consultants/Other Providers [ ] YES  [ ] NO

## 2017-06-27 NOTE — PROGRESS NOTE ADULT - SUBJECTIVE AND OBJECTIVE BOX
Patient is a 84y old  Male who presents with a chief complaint of comfort care and hospice admit (23 Jun 2017 22:39)      INTERVAL HPI/OVERNIGHT EVENTS:    MEDICATIONS  (STANDING):  LORazepam   Injectable 0.5 milliGRAM(s) IV Push daily  pantoprazole   Suspension 40 milliGRAM(s) Oral daily  amiodarone    Tablet 200 milliGRAM(s) Oral daily  ibuprofen  Suspension 400 milliGRAM(s) Oral three times a day  morphine  Infusion 1 mG/Hr (1 mL/Hr) IV Continuous <Continuous>  glycopyrrolate Injectable 0.2 milliGRAM(s) IV Push every 6 hours    MEDICATIONS  (PRN):  morphine  - Injectable 2 milliGRAM(s) IV Push every 3 hours PRN Moderate Pain (4 - 6)  acetaminophen  Suppository 650 milliGRAM(s) Rectal every 6 hours PRN For Temp greater than 38 C (100.4 F)  bisacodyl Suppository 10 milliGRAM(s) Rectal daily PRN Constipation        REVIEW OF SYSTEMS:  CONSTITUTIONAL: No fever, weight loss, or fatigue  EYES: No eye pain, visual disturbances, or discharge  ENMT:  No difficulty hearing, tinnitus, vertigo; No sinus or throat pain  NECK: No pain or stiffness  RESPIRATORY: No cough, wheezing, chills or hemoptysis; No shortness of breath  CARDIOVASCULAR: No chest pain, palpitations, dizziness, or leg swelling  GASTROINTESTINAL: No abdominal or epigastric pain. No nausea, vomiting, or hematemesis; No diarrhea or constipation. No melena or hematochezia.  GENITOURINARY: No dysuria, frequency, hematuria, or incontinence  NEUROLOGICAL: No headaches, memory loss, loss of strength, numbness, or tremors  SKIN: No itching, burning, rashes, or lesions      Vital Signs Last 24 Hrs  T(C): 38.3 (27 Jun 2017 19:43), Max: 38.3 (27 Jun 2017 07:50)  T(F): 101 (27 Jun 2017 19:43), Max: 101 (27 Jun 2017 19:43)  HR: 123 (27 Jun 2017 19:43) (123 - 138)  BP: 63/35 (27 Jun 2017 19:43) (63/35 - 81/45)  BP(mean): --  RR: 18 (27 Jun 2017 19:43) (18 - 20)  SpO2: 94% (27 Jun 2017 19:43) (92% - 94%)    PHYSICAL EXAM:  GENERAL: NAD, well-groomed, well-developed  HEAD:  Atraumatic, Normocephalic  EYES: EOMI, PERRLA, conjunctiva and sclera clear  ENMT: No tonsillar erythema, exudates, or enlargement; Moist mucous membranes   NECK: Supple, No JVD   NERVOUS SYSTEM:  Alert & Oriented X3, Good concentration; Motor Strength 5/5 B/L upper and lower extremities; DTRs 2+ intact and symmetric  CHEST/LUNG: Clear to percussion bilaterally; No rales, rhonchi, wheezing, or rubs  HEART: Regular rate and rhythm; No murmurs, rubs, or gallops  ABDOMEN: Soft, Nontender, Nondistended; Bowel sounds present  EXTREMITIES:  2+ Peripheral Pulses, No clubbing, cyanosis, or edema  LYMPH: No lymphadenopathy noted  SKIN: No rashes or lesions    LABS:              CAPILLARY BLOOD GLUCOSE          RADIOLOGY & ADDITIONAL TESTS:    Imaging Personally Reviewed:  [ ] YES  [ ] NO    Consultant(s) Notes Reviewed:  [ ] YES  [ ] NO    Care Discussed with Consultants/Other Providers [ ] YES  [ ] NO Patient is a 84y old  Male who presents with a chief complaint of comfort care and hospice admit (23 Jun 2017 22:39)      INTERVAL HPI/OVERNIGHT EVENTS:    Events noted  on morphine drip  hypotension noted  lethargy ++    MEDICATIONS  (STANDING):  LORazepam   Injectable 0.5 milliGRAM(s) IV Push daily  pantoprazole   Suspension 40 milliGRAM(s) Oral daily  amiodarone    Tablet 200 milliGRAM(s) Oral daily  ibuprofen  Suspension 400 milliGRAM(s) Oral three times a day  morphine  Infusion 1 mG/Hr (1 mL/Hr) IV Continuous <Continuous>  glycopyrrolate Injectable 0.2 milliGRAM(s) IV Push every 6 hours    MEDICATIONS  (PRN):  morphine  - Injectable 2 milliGRAM(s) IV Push every 3 hours PRN Moderate Pain (4 - 6)  acetaminophen  Suppository 650 milliGRAM(s) Rectal every 6 hours PRN For Temp greater than 38 C (100.4 F)  bisacodyl Suppository 10 milliGRAM(s) Rectal daily PRN Constipation        REVIEW OF SYSTEMS:  CONSTITUTIONAL: No fever, weight loss, or fatigue  EYES: No eye pain, visual disturbances, or discharge  ENMT:  No difficulty hearing, tinnitus, vertigo; No sinus or throat pain  NECK: No pain or stiffness  RESPIRATORY: No cough, wheezing, chills or hemoptysis; No shortness of breath  CARDIOVASCULAR: No chest pain, palpitations, dizziness, or leg swelling  GASTROINTESTINAL: No abdominal or epigastric pain. No nausea, vomiting, or hematemesis; No diarrhea or constipation. No melena or hematochezia.  GENITOURINARY: No dysuria, frequency, hematuria, or incontinence  NEUROLOGICAL: No headaches, memory loss, loss of strength, numbness, or tremors  SKIN: No itching, burning, rashes, or lesions      Vital Signs Last 24 Hrs  T(C): 38.3 (27 Jun 2017 19:43), Max: 38.3 (27 Jun 2017 07:50)  T(F): 101 (27 Jun 2017 19:43), Max: 101 (27 Jun 2017 19:43)  HR: 123 (27 Jun 2017 19:43) (123 - 138)  BP: 63/35 (27 Jun 2017 19:43) (63/35 - 81/45)  BP(mean): --  RR: 18 (27 Jun 2017 19:43) (18 - 20)  SpO2: 94% (27 Jun 2017 19:43) (92% - 94%)    PHYSICAL EXAM:  GENERAL: NAD, well-groomed, well-developed  HEAD:  Atraumatic, Normocephalic  EYES: EOMI, PERRLA, conjunctiva and sclera clear  ENMT: No tonsillar erythema, exudates, or enlargement; Moist mucous membranes   NECK: Supple, No JVD   NERVOUS SYSTEM:  Alert & Oriented X3, Good concentration; Motor Strength 5/5 B/L upper and lower extremities; DTRs 2+ intact and symmetric  CHEST/LUNG: Clear to percussion bilaterally; No rales, rhonchi, wheezing, or rubs  HEART: Regular rate and rhythm; No murmurs, rubs, or gallops  ABDOMEN: Soft, Nontender, Nondistended; Bowel sounds present, Peg in situ  EXTREMITIES:  2+ Peripheral Pulses, No clubbing, cyanosis, or edema  LYMPH: No lymphadenopathy noted  SKIN: No rashes or lesions    LABS:              CAPILLARY BLOOD GLUCOSE          RADIOLOGY & ADDITIONAL TESTS:    Imaging Personally Reviewed:  [ ] YES  [ ] NO    Consultant(s) Notes Reviewed:  [ ] YES  [ ] NO    Care Discussed with Consultants/Other Providers [ ] YES  [ ] NO

## 2017-06-27 NOTE — CHART NOTE - NSCHARTNOTEFT_GEN_A_CORE
Met with patient's dtr this morning to discuss pt status. Pt had a restless night: moaning and agitated.  Has been having increased dyspnea and noisy, labored respirations. Morphine drip was initiated this morning and dtr reports that pt seems much more comfortable now.  Pt resting comfortably during family meeting. Breathing unlabored but pooled posterior pharyngeal secretions are audible.    Family is realistic about pt prognosis. Their goal at present is to keep pt "the way he is right this minute" which is relaxed breathing, appears to be sleeping peacefully.  Dicscussed the impact of continued tube feedings on respiratory status and informed dtr that it can impact increased secretions.  She wishes to continue the feedings but asks if decreasing the rate will reduce fluid burden.  We have discussed with Dr. Sanchez and he has agreed to decrease rate of feedings to 20/hr and start Robinul IV ATC.

## 2017-06-27 NOTE — PROGRESS NOTE ADULT - PROBLEM SELECTOR PLAN 1
Hospice care.Medications as needed for pain and secretion management.
Hospice care.Medications as needed for pain and secretion management.
Hospice care.  Medications as needed for pain and secretion management.

## 2017-06-28 NOTE — CHART NOTE - NSCHARTNOTEFT_GEN_A_CORE
House medicine PA    Called by RN to pronounce patient with cardiopulmonary arrest secondary to Sepsis. Patient found in bed. Family @ bedside. Heart sounds absent, no spontaneous respirations, no palpable B/P or pulse, pupils fixed and dilated. Pronounced @ 0512. Attending Dr Doss will be Notified.

## 2017-06-28 NOTE — DISCHARGE NOTE FOR THE EXPIRED PATIENT - HOSPITAL COURSE
Patient is a 84y old  Male who presents with a chief complaint of comfort care and hospice admit (23 Jun 2017 22:39). Patient was admitted to Hospice/Palliative care:  Admit patient to Hospice Service  Start Morphine infusion 100mg in 100ml NS @ 1mg/hr, titrate accordingly.   Morphine 2mg IVP Q4hrs prn for breakthrough pain.  Albuterol/Atrovent 1 unit neb q8hrs PRN  Oxygen via Nasal Canula at 2 L/min, keep saturation > 94% at all times.   Tylenol 650mg PO Q6hrs PRN for fever.  Start Colace 300mg PO QHS.  Fan in room  DVT Prophylaxis: Heparin 5000 units sc q8hrs   GI Prophylaxis: Protonix 40mg PO QDaily   and Hospice nurse evaluation for sub-acute placement.  DNR/DNI in effect. for fever control and symptom management.

## 2017-06-30 DIAGNOSIS — I10 ESSENTIAL (PRIMARY) HYPERTENSION: ICD-10-CM

## 2017-06-30 DIAGNOSIS — R13.10 DYSPHAGIA, UNSPECIFIED: ICD-10-CM

## 2017-06-30 DIAGNOSIS — Z66 DO NOT RESUSCITATE: ICD-10-CM

## 2017-06-30 DIAGNOSIS — Z93.1 GASTROSTOMY STATUS: ICD-10-CM

## 2017-06-30 DIAGNOSIS — F02.80 DEMENTIA IN OTHER DISEASES CLASSIFIED ELSEWHERE, UNSPECIFIED SEVERITY, WITHOUT BEHAVIORAL DISTURBANCE, PSYCHOTIC DISTURBANCE, MOOD DISTURBANCE, AND ANXIETY: ICD-10-CM

## 2017-06-30 DIAGNOSIS — N40.0 BENIGN PROSTATIC HYPERPLASIA WITHOUT LOWER URINARY TRACT SYMPTOMS: ICD-10-CM

## 2017-06-30 DIAGNOSIS — A41.9 SEPSIS, UNSPECIFIED ORGANISM: ICD-10-CM

## 2017-06-30 DIAGNOSIS — G31.1 SENILE DEGENERATION OF BRAIN, NOT ELSEWHERE CLASSIFIED: ICD-10-CM

## 2017-06-30 DIAGNOSIS — Z79.82 LONG TERM (CURRENT) USE OF ASPIRIN: ICD-10-CM

## 2017-06-30 DIAGNOSIS — Z51.5 ENCOUNTER FOR PALLIATIVE CARE: ICD-10-CM

## 2019-06-18 NOTE — ED ADULT NURSE NOTE - CAS TRG GENERAL NORM CIRC DET
Capillary refill less/equal to 2 seconds/No visible significant external bleeding/Strong peripheral pulses English

## 2020-02-19 NOTE — PROGRESS NOTE ADULT - ASSESSMENT
community acquired pneumonia  resp distress  Altered Mental Status   dnr  continue supportive care  for hospice noted   continue antibiotics for now patient/family

## 2020-02-20 NOTE — PROGRESS NOTE ADULT - ASSESSMENT
control fever  continue symptomatic therapy
control fever  disposition to home hospice
No indicators present

## 2022-01-12 NOTE — ED PROVIDER NOTE - PR
01/12/22 1423   Wound Buttocks Right 09/22/21   Date First Assessed/Time First Assessed: 09/22/21 1638   Wound Approximate Age at First Assessment (Weeks): 2 weeks  Primary Wound Type: Pressure Injury  Location: Buttocks  Wound Location Orientation: Right  Date of First Observation: 09/22/21   Wound Image    Wound Etiology Pressure Stage 3   Dressing Status Old drainage noted   Cleansed Cleansed with saline   Wound Assessment Pink/red   Drainage Amount Moderate   Drainage Description Serosanguinous   Wound Odor None   Ebony-Wound/Incision Assessment   (Zinc periwound)   Edges Flat/open edges   Wound Thickness Description Full thickness   Wound Leg lower Right #1 07/07/21   Date First Assessed/Time First Assessed: 07/07/21 1414   Present on Hospital Admission: Yes  Wound Approximate Age at First Assessment (Weeks): 52 weeks  Primary Wound Type: Venous  Location: Leg lower  Wound Location Orientation: Right  Wound Descrip. .. Wound Image    Wound Etiology Venous   Dressing Status Intact   Cleansed Soap and water   Wound Length (cm) 0.1 cm   Wound Width (cm) 0.1 cm   Wound Depth (cm) 0.1 cm   Wound Surface Area (cm^2) 0.01 cm^2   Change in Wound Size % 100   Wound Volume (cm^3) 0.001 cm^3   Wound Healing % 100   Drainage Amount Scant   Drainage Description Serous   Wound Odor None   Wound Leg lower Left #2 07/07/21   Date First Assessed/Time First Assessed: 07/07/21 1415   Present on Hospital Admission: Yes  Wound Approximate Age at First Assessment (Weeks): 52 weeks  Primary Wound Type: Venous  Location: Leg lower  Wound Location Orientation: Left  Wound Descript. ..    Wound Image    Wound Etiology Venous   Dressing Status Intact   Cleansed Soap and water   Wound Length (cm) 0.1 cm   Wound Width (cm) 0.1 cm   Wound Depth (cm) 0.1 cm   Wound Surface Area (cm^2) 0.01 cm^2   Change in Wound Size % 100   Wound Volume (cm^3) 0.001 cm^3   Wound Healing % 100   Drainage Amount Small   Drainage Description Serous   Wound Odor None   Ebony-Wound/Incision Assessment Dry/flaky   Edges Undefined edges   Pain 1   Pain Scale 1 Numeric (0 - 10)   Pain Intensity 1 5   Pain Location 1 Buttocks   Pain Orientation 1 Right   Pain Description 1 Sore   Pain Intervention(s) 1 Repositioned 164

## 2022-10-13 NOTE — PROGRESS NOTE ADULT - ASSESSMENT
Continue present therapy  diet as tolerated Sski Pregnancy And Lactation Text: This medication is Pregnancy Category D and isn't considered safe during pregnancy. It is excreted in breast milk.

## 2025-03-06 NOTE — ED PROVIDER NOTE - MEDICAL DECISION MAKING DETAILS
: Yes
Patient with blocked strickland catheter. Strickland replaced with instantaneous relief. He is currently in good condition and discharged with care instructions. Patient has appointment with urologist next week.